# Patient Record
Sex: FEMALE | Race: WHITE | Employment: FULL TIME | URBAN - METROPOLITAN AREA
[De-identification: names, ages, dates, MRNs, and addresses within clinical notes are randomized per-mention and may not be internally consistent; named-entity substitution may affect disease eponyms.]

---

## 2020-07-08 ENCOUNTER — OFFICE VISIT (OUTPATIENT)
Dept: ORTHOPEDIC SURGERY | Age: 20
End: 2020-07-08

## 2020-07-08 VITALS
SYSTOLIC BLOOD PRESSURE: 129 MMHG | BODY MASS INDEX: 32.78 KG/M2 | OXYGEN SATURATION: 99 % | WEIGHT: 185 LBS | HEART RATE: 80 BPM | HEIGHT: 63 IN | TEMPERATURE: 98 F | DIASTOLIC BLOOD PRESSURE: 88 MMHG

## 2020-07-08 DIAGNOSIS — S83.92XA SPRAIN OF LEFT KNEE, UNSPECIFIED LIGAMENT, INITIAL ENCOUNTER: Primary | ICD-10-CM

## 2020-07-08 DIAGNOSIS — M25.562 ACUTE PAIN OF LEFT KNEE: ICD-10-CM

## 2020-07-08 NOTE — PROGRESS NOTES
Patient: Megan Alvarez                MRN: [id number]       SSN: [id number]  YOB: 2000        AGE: 23 y.o. SEX: female    PCP: No primary care provider on file. 07/08/20    Chief Complaint   Patient presents with    Knee Pain     LEFT     HISTORY:  Megan Alvarez is a 23 y.o. female who sustained a left knee injury on 6/26/20. She landed awkwardly on her left leg while jumping on a trampoline. She states she felt and heard a pop. Her knee swelled immediately after the injury. [facility]was [facility] on the same day where left knee x rays revealed no acute findings. She was provided a knee immobilizer and referred for orthopedic consultation. She has been experiencing pain and swelling since the injury. She is unable to ambulate without crutches. She has been out of work since her injury. Her leg feels loose and like its been in a freezer. Pain Assessment  7/8/2020   Location of Pain Knee   Location Modifiers Left   Severity of Pain 2   Quality of Pain Throbbing   Frequency of Pain Intermittent   Aggravating Factors Standing   Limiting Behavior Yes   Relieving Factors Ice;NSAID   Result of Injury Yes   Work-Related Injury No   Type of Injury Fall     Occupation, etc:  Ms. Shila Edge is a  for [employer] of work since her injury. She lives in [place] with her fiance and his parents. Ms. Shila Edge weighs 185 lbs and is 5'2.5\" tall. No results found for: HBA1C, HGBE8, JBQ0HAUM, RLB2DHMO, JBB3EFQF  Weight Metrics 7/8/2020   Weight 185 lb   BMI 33.3 kg/m2       There is no problem list on file for this patient.     REVIEW OF SYSTEMS:    Constitutional Symptoms: Negative   Eyes: Negative   Ears, Nose, Throat and Mouth: Negative   Cardiovascular: Negative   Respiratory: Negative   Genitourinary: Per HPI   Gastrointestinal: Per HPI   Integumentary (Skin and/or Breast): Negative   Musculoskeletal: Per HPI   Endocrine/Rheumatologic: Negative   Neurological: Per HPI   Hematology/Lymphatic: Negative    Allergic/Immunologic: Negative   Phychiatric: Negative    Social History     Socioeconomic History    Marital status: UNKNOWN     Spouse name: Not on file    Number of children: Not on file    Years of education: Not on file    Highest education level: Not on file   Occupational History    Not on file   Social Needs    Financial resource strain: Not on file    Food insecurity     Worry: Not on file     Inability: Not on file    Transportation needs     Medical: Not on file     Non-medical: Not on file   Tobacco Use    Smoking status: Current Some Day Smoker    Smokeless tobacco: Never Used   Substance and Sexual Activity    Alcohol use: Not on file    Drug use: Not on file    Sexual activity: Not on file   Lifestyle    Physical activity     Days per week: Not on file     Minutes per session: Not on file    Stress: Not on file   Relationships    Social connections     Talks on phone: Not on file     Gets together: Not on file     Attends Muslim service: Not on file     Active member of club or organization: Not on file     Attends meetings of clubs or organizations: Not on file     Relationship status: Not on file    Intimate partner violence     Fear of current or ex partner: Not on file     Emotionally abused: Not on file     Physically abused: Not on file     Forced sexual activity: Not on file   Other Topics Concern    Not on file   Social History Narrative    Not on file      No Known Allergies   No current outpatient medications on file. No current facility-administered medications for this visit.        PHYSICAL EXAMINATION:  Visit Vitals  /88   Pulse 80   Temp 98 °F (36.7 °C) (Oral)   Ht 5' 2.5\" (1.588 m)   Wt 185 lb (83.9 kg)   SpO2 99%   BMI 33.30 kg/m²      ORTHO EXAMINATION:  Examination Right knee Left knee   Skin Intact Intact   Range of motion 120-0 40-20 with pain   Effusion - -   Medial joint line tenderness + +   Lateral joint line tenderness - -   Popliteal tenderness - -   Osteophytes palpable - -   Mayis - -   Patella crepitus - -   Anterior drawer - + with guarding   Lateral laxity - -   Medial laxity - -   Varus deformity - -   Valgus deformity - -   Pretibial edema - -   Calf tenderness - -      Using crutches    RADIOGRAPHS:  XR LEFT KNEE 6/27/20 [facility]  IMPRESSION No acute fractures or subluxation of left knee. -I have independently reviewed these images during this office visit. -Dr. Aristeo Tamez:  Three views - No fractures, no effusion, no joint space narrowing, no osteophytes present. Kellgren Isreal grade 0     IMPRESSION:      ICD-10-CM ICD-9-CM    1. Sprain of left knee, unspecified ligament, initial encounter S83. 92XA 844.9 REFERRAL TO PHYSICAL THERAPY      MRI KNEE LT WO CONT   2. Acute pain of left knee M25.562 719.46 REFERRAL TO PHYSICAL THERAPY      MRI KNEE LT WO CONT     PLAN: She will start a brief course of outpatient physical therapy. Left knee MRI ordered. Work note provided. Continue using crutches. She will follow up on 7/15/20 with her MRI results.     Scribed by Olinda Nguyễn ([address]) as dictated by Anayeli Medina MD

## 2020-10-05 ENCOUNTER — HOSPITAL ENCOUNTER (OUTPATIENT)
Age: 20
Discharge: HOME | End: 2020-10-05
Attending: SPECIALIST
Payer: COMMERCIAL

## 2020-10-05 DIAGNOSIS — M25.562 ACUTE PAIN OF LEFT KNEE: ICD-10-CM

## 2020-10-05 DIAGNOSIS — S83.92XA SPRAIN OF LEFT KNEE, UNSPECIFIED LIGAMENT, INITIAL ENCOUNTER: ICD-10-CM

## 2020-10-05 PROCEDURE — 73721 MRI JNT OF LWR EXTRE W/O DYE: CPT

## 2020-10-14 ENCOUNTER — OFFICE VISIT (OUTPATIENT)
Dept: ORTHOPEDIC SURGERY | Age: 20
End: 2020-10-14
Payer: COMMERCIAL

## 2020-10-14 VITALS
BODY MASS INDEX: 35.19 KG/M2 | HEART RATE: 92 BPM | HEIGHT: 63 IN | WEIGHT: 198.6 LBS | TEMPERATURE: 97.7 F | SYSTOLIC BLOOD PRESSURE: 121 MMHG | DIASTOLIC BLOOD PRESSURE: 85 MMHG | OXYGEN SATURATION: 96 %

## 2020-10-14 DIAGNOSIS — M25.562 ACUTE PAIN OF LEFT KNEE: ICD-10-CM

## 2020-10-14 DIAGNOSIS — S83.92XD SPRAIN OF LEFT KNEE, UNSPECIFIED LIGAMENT, SUBSEQUENT ENCOUNTER: ICD-10-CM

## 2020-10-14 DIAGNOSIS — E66.01 SEVERE OBESITY (HCC): ICD-10-CM

## 2020-10-14 DIAGNOSIS — S83.242A TEAR OF MEDIAL MENISCUS OF LEFT KNEE, UNSPECIFIED TEAR TYPE, UNSPECIFIED WHETHER OLD OR CURRENT TEAR, INITIAL ENCOUNTER: Primary | ICD-10-CM

## 2020-10-14 PROCEDURE — 99213 OFFICE O/P EST LOW 20 MIN: CPT | Performed by: SPECIALIST

## 2020-10-14 NOTE — PROGRESS NOTES
Patient: Juan Luis Ward                MRN: [id number]       SSN: [id number]  YOB: 2000        AGE: 23 y.o. SEX: female    PCP: None  10/14/20    CC: LEFT KNEE DISCOMFORT    HISTORY:  Juan Luis Ward is a 23 y.o. female who is seen for left knee pain. She sustained a left knee injury on 6/26/20. She landed awkwardly on her left leg while jumping on a trampoline. She states she felt and heard a pop. Her knee swelled immediately after the injury. [facility]was [facility] at [facility] on the same day where left knee x rays revealed no acute findings. She was provided a knee immobilizer and referred for orthopedic consultation. She has been experiencing pain and swelling since the injury. She is unable to ambulate without crutches. She has been out of work since her injury. Her leg feels loose and like its been in a freezer. She still has difficulty walking and straightening her knee. She feels as if her knee will snap in full extension. She experiences giving way episodes if she is full weightbearing. Pain Assessment  10/14/2020   Location of Pain Knee   Location Modifiers Left   Severity of Pain 2   Quality of Pain Sharp   Frequency of Pain Several times daily   Frequency of Pain Comment varies   Aggravating Factors Standing;Walking   Aggravating Factors Comment any pressure on left knee   Limiting Behavior -   Relieving Factors Ice;Elevation   Result of Injury -   Work-Related Injury -   Type of Injury -     Occupation, etc:  Ms. Niki Collazo is a  for [employer] of work since her injury. She lives in [place] with her fiance and his parents. Ms. Niki Collazo weighs 185 lbs and is 5'2.5\" tall. No results found for: HBA1C, HGBE8, ZIO6HIUK, GTJ5JIHB, AHD0KJGH  Weight Metrics 10/14/2020 7/8/2020   Weight 198 lb 9.6 oz 185 lb   BMI 35.75 kg/m2 33.3 kg/m2       There is no problem list on file for this patient.     REVIEW OF SYSTEMS:    Constitutional Symptoms: Negative   Eyes: Negative   Ears, Nose, Throat and Mouth: Negative   Cardiovascular: Negative   Respiratory: Negative   Genitourinary: Per HPI   Gastrointestinal: Per HPI   Integumentary (Skin and/or Breast): Negative   Musculoskeletal: Per HPI   Endocrine/Rheumatologic: Negative   Neurological: Per HPI   Hematology/Lymphatic: Negative    Allergic/Immunologic: Negative   Phychiatric: Negative    Social History     Socioeconomic History    Marital status: UNKNOWN     Spouse name: Not on file    Number of children: Not on file    Years of education: Not on file    Highest education level: Not on file   Occupational History    Not on file   Social Needs    Financial resource strain: Not on file    Food insecurity     Worry: Not on file     Inability: Not on file    Transportation needs     Medical: Not on file     Non-medical: Not on file   Tobacco Use    Smoking status: Current Some Day Smoker    Smokeless tobacco: Never Used   Substance and Sexual Activity    Alcohol use: Not on file    Drug use: Not on file    Sexual activity: Not on file   Lifestyle    Physical activity     Days per week: Not on file     Minutes per session: Not on file    Stress: Not on file   Relationships    Social connections     Talks on phone: Not on file     Gets together: Not on file     Attends Christian service: Not on file     Active member of club or organization: Not on file     Attends meetings of clubs or organizations: Not on file     Relationship status: Not on file    Intimate partner violence     Fear of current or ex partner: Not on file     Emotionally abused: Not on file     Physically abused: Not on file     Forced sexual activity: Not on file   Other Topics Concern    Not on file   Social History Narrative    Not on file      No Known Allergies   No current outpatient medications on file. No current facility-administered medications for this visit.        PHYSICAL EXAMINATION:  Visit Vitals  /85 (BP 1 Location: Right arm, BP Patient Position: Sitting)   Pulse 92   Temp 97.7 °F (36.5 °C) (Temporal)   Ht 5' 2.5\" (1.588 m)   Wt 198 lb 9.6 oz (90.1 kg)   LMP  (LMP Unknown)   SpO2 96%   BMI 35.75 kg/m²      ORTHO EXAMINATION:  Examination Right knee Left knee   Skin Intact Intact   Range of motion 120-0 90-10    Effusion - -   Medial joint line tenderness + +   Lateral joint line tenderness - +    Popliteal tenderness - -   Osteophytes palpable - -   Mayis - -   Patella crepitus - -   Anterior drawer - + firm endpoint   Lateral laxity - -   Medial laxity - -   Varus deformity - -   Valgus deformity - -   Pretibial edema - -   Calf tenderness - -   Using crutches    MRI LEFT KNEE 10/5/20  IMPRESSION:  1. Horizontal tear of the posterior horn of the medial meniscus with associated  tiny intrameniscal ganglion cyst. Adjacent low grade vertical longitudinal tear  extending to the tibial surface. 2.  Small caliber ACL without appreciable focal defect and no significant  surrounding inflammatory change. This could reflect a normal anatomic variant or  chronic partial thickness tear. No evidence of high-grade tear. RADIOGRAPHS:  XR LEFT KNEE 6/27/20 [facility]  IMPRESSION No acute fractures or subluxation of left knee. -I have independently reviewed these images during this office visit. -Dr. Lala Freeman:  Three views - No fractures, no effusion, no joint space narrowing, no osteophytes present. Kellgren Isreal grade 0     IMPRESSION:      ICD-10-CM ICD-9-CM    1. Tear of medial meniscus of left knee, unspecified tear type, unspecified whether old or current tear, initial encounter  S83.242A 836.0 REFERRAL TO PHYSICAL THERAPY   2. Acute pain of left knee  M25.562 719.46    3. Sprain of left knee, unspecified ligament, subsequent encounter  S83. 92XD V58.89 REFERRAL TO PHYSICAL THERAPY     844.9      PLAN: Left knee MRI reviewed. She will start a brief course of outpatient physical therapy.  There is no need for surgery or injection at this time. She will follow up with Dr. Patito CHAVIS after physical therapy for a second opinion re surgical options.      Scribed by Milena Flores ([address]) as dictated by Johnny Bailey MD

## 2020-10-16 ENCOUNTER — HOSPITAL ENCOUNTER (OUTPATIENT)
Dept: PHYSICAL THERAPY | Age: 20
Discharge: HOME | End: 2020-10-16
Payer: COMMERCIAL

## 2020-10-16 PROCEDURE — 97162 PT EVAL MOD COMPLEX 30 MIN: CPT

## 2020-10-16 NOTE — PROGRESS NOTES
[facility]  [address]  [place], [address]  [phone] fax    Plan of Care/ Statement of Necessity for Physical Therapy Services    Patient name: Gideon Chow Start of Care: 10/16/2020   Referral source: Carlos Manuel Mejia MD : 2000    Medical Diagnosis: Pain in left knee [M25.562]  Payor: [place] / Plan: [id number] / Product Type: Managed Care Medicaid /  Onset Date:2020    Treatment Diagnosis: left knee pain   Prior Hospitalization: see medical history Provider#: [id number]   Medications: Verified on Patient summary List    Comorbidities: depression   Prior Level of Function: functionally I with all activities     The Plan of Care and following information is based on the information from the initial evaluation. Assessment/ key information: 22 y/o female presents with left knee pain after landing on the left leg while jumping on trampoline. She reports she did not have insurance at the time, which delayed her care. She has had a MRI and reports it showed meniscus tear. She has been ambulating with use of axillary crutches since the incident. The pt lacks 40 degrees of extension of the left knee and flexion measures 92 degrees actively. Passively her extension lacks 38 degrees to 100 degrees of flexion. Limited flexibility of the quads, hamstrings and gastroc. Limited strength is noted is the left LE as well. The pt will benefit from PT to address the aforementioned impairments. Evaluation Complexity History LOW Complexity : Zero comorbidities / personal factors that will impact the outcome / POC; Examination HIGH Complexity : 4+ Standardized tests and measures addressing body structure, function, activity limitation and / or participation in recreation  ;Presentation MEDIUM Complexity : Evolving with changing characteristics  ; Clinical Decision Making MEDIUM Complexity : FOTO score of 26-74  Overall Complexity Rating: MEDIUM  Problem List: pain affecting function, decrease ROM, decrease strength, impaired gait/ balance, decrease ADL/ functional abilitiies, decrease activity tolerance and decrease flexibility/ joint mobility   Treatment Plan may include any combination of the following: Therapeutic exercise, Therapeutic activities, Neuromuscular re-education, Physical agent/modality, Gait/balance training, Manual therapy, Aquatic therapy, Patient education and Self Care training  Patient / Family readiness to learn indicated by: asking questions and trying to perform skills  Persons(s) to be included in education: patient (P)  Barriers to Learning/Limitations: None  Patient Goal (s): To walk right again  Patient Self Reported Health Status: good  Rehabilitation Potential: good    Short Term Goals: To be accomplished in 1 weeks:   1. The pt will be I and compliant with HEP     Long Term Goals: To be accomplished in 4 weeks:   1. Improve FOTO score to 68 for improved ability for functional tasks. .   2. Improve left knee AROM extension to lacking 5 degrees or less to improve ability for gait. 3. Improve left knee AROM flexion to 120 degrees for improved ability for daily tasks   4. The pt will progress to ambulating without use of crutches in home. Frequency / Duration: Patient to be seen 3 times per week for 4 weeks. Patient/ Caregiver education and instruction: Diagnosis, prognosis, self care, activity modification and exercises   [x]  Plan of care has been reviewed with PTA Venita Kayser, PT 10/16/2020 8:19 AM    ________________________________________________________________________    I certify that the above Therapy Services are being furnished while the patient is under my care. I agree with the treatment plan and certify that this therapy is necessary.     Physician's Signature:____________Date:_________TIME:________    [employer], Date and Time must be completed for valid certification **    Please sign and return to [facility] [address] [facility] [address]  [address]  [place], [address]  [phone] fax

## 2020-10-16 NOTE — PROGRESS NOTES
PT DAILY TREATMENT NOTE 10-18    Patient Name: Lian Harmon  Date:10/16/2020  : 2000  [x]  Patient  Verified  Payor: Maddie 22 / Plan: [id number] / Product Type: Managed Care Medicaid /    In PMHW:[id number]  Out time:0820  Total Treatment Time (min): 35  Visit #: 1 of 12    Treatment Area: Pain in left knee [M25.562]    SUBJECTIVE  Pain Level (0-10 scale): 4  Any medication changes, allergies to medications, adverse drug reactions, diagnosis change, or new procedure performed?: [x] No    [] Yes (see summary sheet for update)  Subjective functional status/changes:   [] No changes reported       OBJECTIVE    Modality rationale:    Min Type Additional Details    [] Estim:  []Unatt       []IFC  []Premod                        []Other:  []w/ice   []w/heat  Position:  Location:    [] Estim: []Att    []TENS instruct  []NMES                    []Other:  []w/US   []w/ice   []w/heat  Position:  Location:    []  Traction: [] Cervical       []Lumbar                       [] Prone          []Supine                       []Intermittent   []Continuous Lbs:  [] before manual  [] after manual    []  Ultrasound: []Continuous   [] Pulsed                           []1MHz   []3MHz W/cm2:  Location:    []  Iontophoresis with dexamethasone         Location: [] Take home patch   [] In clinic    []  Ice     []  heat  []  Ice massage  []  Laser   []  Anodyne Position:  Location:    []  Laser with stim  []  Other:  Position:  Location:    []  Vasopneumatic Device Pressure:       [] lo [] med [] hi   Temperature: [] lo [] med [] hi   [] Skin assessment post-treatment:  []intact []redness- no adverse reaction    []redness  adverse reaction:     25 min [x]Eval                  []Re-Eval       10 min Therapeutic Exercise:  [] See flow sheet : HEP   Rationale: increase ROM and increase strength to improve the patients ability to perform functional tasks.                With   [] TE   [] TA   [] neuro   [] other: Patient Education: [x] Review HEP    [] Progressed/Changed HEP based on:   [] positioning   [] body mechanics   [] transfers   [] heat/ice application    [] other:      Other Objective/Functional Measures:       Pain Level (0-10 scale) post treatment: 4    ASSESSMENT/Changes in Function:      Patient will continue to benefit from skilled PT services to modify and progress therapeutic interventions, address functional mobility deficits, address ROM deficits, address strength deficits, analyze and address soft tissue restrictions, analyze and cue movement patterns and analyze and modify body mechanics/ergonomics to attain remaining goals. [x]  See Plan of Care  []  See progress note/recertification  []  See Discharge Summary         Progress towards goals / Updated goals:  Short Term Goals: To be accomplished in 1 weeks:   1. The pt will be I and compliant with HEP   IE- issued HEP  Long Term Goals: To be accomplished in 4 weeks:   1. Improve FOTO score to 68 for improved ability for functional tasks. Elana Boop IE- 52   2. Improve left knee AROM extension to lacking 5 degrees or less to improve ability for gait. IE- lacking 40 degrees   3. Improve left knee AROM flexion to 120 degrees for improved ability for daily tasks   IE: 92 degrees   4. The pt will progress to ambulating without use of crutches in home.    IE- crutches and primarily NWB left LE         PLAN  []  Upgrade activities as tolerated     []  Continue plan of care  []  Update interventions per flow sheet       []  Discharge due to:_  []  Other:_      Agnieszka Lara PT 10/16/2020  7:36 AM    Future Appointments   Date Time Provider Graciela Loyd   10/16/2020  7:45 AM Melissa Merlin, [facility]

## 2020-10-23 ENCOUNTER — HOSPITAL ENCOUNTER (OUTPATIENT)
Dept: PHYSICAL THERAPY | Age: 20
Discharge: HOME | End: 2020-10-23
Payer: COMMERCIAL

## 2020-10-23 PROCEDURE — 97110 THERAPEUTIC EXERCISES: CPT

## 2020-10-23 PROCEDURE — 97112 NEUROMUSCULAR REEDUCATION: CPT

## 2020-10-23 PROCEDURE — 97140 MANUAL THERAPY 1/> REGIONS: CPT

## 2020-10-23 NOTE — PROGRESS NOTES
PT DAILY TREATMENT NOTE 10-18    Patient Name: Gemma Thomason  Date:10/23/2020  : 2000  [x]  Patient  Verified  Payor: Maddie 22 / Plan: [address] / Product Type: Managed Care Medicaid /    In time:9:15  Out time:10:00  Total Treatment Time (min): 45  Visit #: 2 of 12    Treatment Area: Pain in left knee [M25.562]    SUBJECTIVE  Pain Level (0-10 scale): 3/10  Any medication changes, allergies to medications, adverse drug reactions, diagnosis change, or new procedure performed?: [x] No    [] Yes (see summary sheet for update)  Subjective functional status/changes:   [] No changes reported  Pt reports no new complaints of pain. OBJECTIVE    Modality rationale: decrease inflammation and decrease pain to improve the patients ability to perform ADLs with increased ease.     Min Type Additional Details    [] Estim:  []Unatt       []IFC  []Premod                        []Other:  []w/ice   []w/heat  Position:  Location:    [] Estim: []Att    []TENS instruct  []NMES                    []Other:  []w/US   []w/ice   []w/heat  Position:  Location:    []  Traction: [] Cervical       []Lumbar                       [] Prone          []Supine                       []Intermittent   []Continuous Lbs:  [] before manual  [] after manual    []  Ultrasound: []Continuous   [] Pulsed                           []1MHz   []3MHz W/cm2:  Location:    []  Iontophoresis with dexamethasone         Location: [] Take home patch   [] In clinic   10 [x]  Ice     []  heat  []  Ice massage  []  Laser   []  Anodyne Position:sitting  Location:left knee    []  Laser with stim  []  Other:  Position:  Location:    []  Vasopneumatic Device Pressure:       [] lo [] med [] hi   Temperature: [] lo [] med [] hi   [] Skin assessment post-treatment:  []intact []redness- no adverse reaction    []redness  adverse reaction:     17 min Therapeutic Exercise:  [x] See flow sheet :   Rationale: increase ROM and increase strength to improve the patients ability to perform ADLs. 10 min Neuromuscular Re-education:  [x]  See flow sheet :   Rationale: increase strength, improve coordination and increase proprioception  to improve the patients ability to perform ADLs with increased ease. 8 min Manual Therapy:  PROM into knee extension; patellar mobs inferior/superior grade 2-3. Rationale: decrease pain, increase ROM and increase tissue extensibility to improve functional mobility when ambulating. With   [] TE   [] TA   [] neuro   [] other: Patient Education: [x] Review HEP    [] Progressed/Changed HEP based on:   [] positioning   [] body mechanics   [] transfers   [] heat/ice application    [] other:      Other Objective/Functional Measures: Initiated exercises as per flow sheet. Pain Level (0-10 scale) post treatment: 4/10    ASSESSMENT/Changes in Function: Pt demonstrates improved knee extensions post manual treatment. Patient will continue to benefit from skilled PT services to modify and progress therapeutic interventions, address functional mobility deficits, address ROM deficits, address strength deficits, analyze and address soft tissue restrictions, analyze and cue movement patterns, analyze and modify body mechanics/ergonomics and assess and modify postural abnormalities to attain remaining goals. []  See Plan of Care  []  See progress note/recertification  []  See Discharge Summary         Progress towards goals / Updated goals:  Short Term Goals: To be accomplished in 1 weeks:              1. The pt will be I and compliant with HEP              IE- issued HEP  Long Term Goals: To be accomplished in 4 weeks:              1. Improve FOTO score to 68 for improved ability for functional tasks. Karen Colvin IE- 52              2. Improve left knee AROM extension to lacking 5 degrees or less to improve ability for gait.               IE- lacking 40 degrees              3. Improve left knee AROM flexion to 120 degrees for improved ability for daily tasks              IE: 92 degrees              4. The pt will progress to ambulating without use of crutches in home.               IE- crutches and primarily NWB left LE    PLAN  []  Upgrade activities as tolerated     [x]  Continue plan of care  []  Update interventions per flow sheet       []  Discharge due to:_  []  Other:_      Ernesto Talbot, PTA 10/23/2020  9:24 AM    Future Appointments   Date Time Provider Graciela Loyd   10/27/2020 12:00 PM Annia Marcum, PTA [facility]   10/28/2020 12:45 PM Annia Marcum, PTA [facility]   10/30/2020 12:45 PM Deon Spencer, PT [facility]   11/2/2020 12:00 PM Annia Marcum, PTA [facility]   11/4/2020 12:00 PM Annia Marcum, PTA [facility]   11/6/2020 11:30 AM Annia Marcum, PTA [facility]   11/9/2020 12:00 PM Annia Marcum, PTA [facility]   11/11/2020 12:00 PM Deon Spencer, PT [facility]   11/13/2020 12:00 PM Deon Spencer, PT [facility]   11/16/2020 12:00 PM Annia Marcum, PTA [facility]

## 2020-10-27 ENCOUNTER — HOSPITAL ENCOUNTER (OUTPATIENT)
Dept: PHYSICAL THERAPY | Age: 20
Discharge: HOME | End: 2020-10-27
Payer: COMMERCIAL

## 2020-10-27 PROCEDURE — 97112 NEUROMUSCULAR REEDUCATION: CPT

## 2020-10-27 PROCEDURE — 97110 THERAPEUTIC EXERCISES: CPT

## 2020-10-27 PROCEDURE — 97116 GAIT TRAINING THERAPY: CPT

## 2020-10-27 NOTE — PROGRESS NOTES
PT DAILY TREATMENT NOTE 10-18    Patient Name: Anton Mendes  Date:10/27/2020  : 2000  [x]  Patient  Verified  Payor: Maddie 22 / Plan: [id number] / Product Type: Managed Care Medicaid /    In time:12:00  Out time:12:46  Total Treatment Time (min): 55  Visit #: 3 of 12    Treatment Area: Pain in left knee [M25.562]    SUBJECTIVE  Pain Level (0-10 scale): 0/10  Any medication changes, allergies to medications, adverse drug reactions, diagnosis change, or new procedure performed?: [x] No    [] Yes (see summary sheet for update)  Subjective functional status/changes:   [x] No changes reported    OBJECTIVE    Modality rationale: decrease inflammation and decrease pain to improve the patients ability to perform ADL's. Min Type Additional Details    [] Estim:  []Unatt       []IFC  []Premod                        []Other:  []w/ice   []w/heat  Position:  Location:    [] Estim: []Att    []TENS instruct  []NMES                    []Other:  []w/US   []w/ice   []w/heat  Position:  Location:    []  Traction: [] Cervical       []Lumbar                       [] Prone          []Supine                       []Intermittent   []Continuous Lbs:  [] before manual  [] after manual    []  Ultrasound: []Continuous   [] Pulsed                           []1MHz   []3MHz W/cm2:  Location:    []  Iontophoresis with dexamethasone         Location: [] Take home patch   [] In clinic   10 [x]  Ice     []  heat  []  Ice massage  []  Laser   []  Anodyne Position: Reclined  Location: Left knee    []  Laser with stim  []  Other:  Position:  Location:    []  Vasopneumatic Device Pressure:       [] lo [] med [] hi   Temperature: [] lo [] med [] hi   [] Skin assessment post-treatment:  []intact []redness- no adverse reaction    []redness  adverse reaction:     15 min Therapeutic Exercise:  [x] See flow sheet :   Rationale: increase ROM and increase strength to improve the patients ability to perform ADL's. 8 min Neuromuscular Re-education:  [x]  See flow sheet : Weight shifts, TKE with ball on wall. Rationale: increase strength, improve coordination and increase proprioception  to improve the patients ability to ambulate without AD at home and in community. 5 min Manual Therapy:  Left patellar mobs, knee extension mobs. Knee PROM ext/flex. Rationale: decrease pain, increase ROM and increase tissue extensibility to improve heel/toe gait pattern. 8 min Gait Trainin feet with Right axillary crutch device on level surfaces with Supervised level of assist. Cueing for Left heel strike and toe off. Rationale: normalize gait so pt may ambulate without AD. With   [x] TE   [] TA   [] neuro   [] other: Patient Education: [x] Review HEP    [] Progressed/Changed HEP based on:   [] positioning   [] body mechanics   [] transfers   [] heat/ice application    [] other:      Other Objective/Functional Measures: Ambulating with Right axillary crutch and Left toe weight bearing, worked on heel strike to normalize gait pattern. Added TKE with ball against wall 10x5\". Added supine SLR flexion. Pain Level (0-10 scale) post treatment: 4/10    ASSESSMENT/Changes in Function: Pt fully participated in treatment. Able to perform SLR's, unable to maintain TKE. Demonstrated improved gait mechanics, improved Left heel strike after receiving cueing and practice. Patient will continue to benefit from skilled PT services to modify and progress therapeutic interventions, address functional mobility deficits, address ROM deficits, address strength deficits, analyze and address soft tissue restrictions and analyze and cue movement patterns to attain remaining goals. [x]  See Plan of Care  []  See progress note/recertification  []  See Discharge Summary         Progress towards goals / Updated goals:  Short Term Goals: To be accomplished in 1 weeks:              1.  The pt will be I and compliant with HEP              BF- issued HEP   Current: Met, pt reports HEP compliance. 10/27/2020  Long Term Goals: To be accomplished in 4 weeks:              1. Improve FOTO score to 68 for improved ability for functional tasks. .              IK- 52              2. Improve left knee AROM extension to lacking 5 degrees or less to improve ability for gait.             LR- lacking 40 degrees              3. Improve left knee AROM flexion to 120 degrees for improved ability for daily tasks              IE: 92 degrees              4.  The pt will progress to ambulating without use of crutches in home.              IE- crutches and primarily NWB left LE    PLAN  []  Upgrade activities as tolerated     [x]  Continue plan of care  []  Update interventions per flow sheet       []  Discharge due to:_  []  Other:_      Derrick Vazquez, PTA 10/27/2020  11:54 AM    Future Appointments   Date Time Provider Graciela Loyd   10/27/2020 12:00 PM Ani Faster, PTA [facility]   10/28/2020 12:45 PM Ani Faster, PTA [facility]   10/30/2020 12:45 PM Belcamp Magallanes, PT [facility]   11/2/2020 12:00 PM Ani Faster, PTA [facility]   11/4/2020 12:00 PM Ani Faster, PTA [facility]   11/6/2020 11:30 AM Ani Faster, PTA [facility]   11/9/2020 12:00 PM Nai Faster, PTA [facility]   11/11/2020 12:00 PM Bob Magallanes, PT [facility]   11/13/2020 12:00 PM Belcamp Magallanes, PT [facility]   11/16/2020 12:00 PM Ani Faster, PTA [facility]

## 2020-10-28 ENCOUNTER — HOSPITAL ENCOUNTER (OUTPATIENT)
Dept: PHYSICAL THERAPY | Age: 20
Discharge: HOME | End: 2020-10-28
Payer: COMMERCIAL

## 2020-10-28 PROCEDURE — 97112 NEUROMUSCULAR REEDUCATION: CPT

## 2020-10-28 PROCEDURE — 97110 THERAPEUTIC EXERCISES: CPT

## 2020-10-28 PROCEDURE — 97116 GAIT TRAINING THERAPY: CPT

## 2020-10-28 NOTE — PROGRESS NOTES
PT DAILY TREATMENT NOTE 10-18    Patient Name: Dari Patel  Date:10/28/2020  : 2000  [x]  Patient  Verified  Payor: Maddie 22 / Plan: [id number] / Product Type: Managed Care Medicaid /    In time:12:45  Out time:1:31  Total Treatment Time (min): 55  Visit #: 4 of 12    Treatment Area: Pain in left knee [M25.562]    SUBJECTIVE  Pain Level (0-10 scale): 5/10  Any medication changes, allergies to medications, adverse drug reactions, diagnosis change, or new procedure performed?: [x] No    [] Yes (see summary sheet for update)  Subjective functional status/changes:   [] No changes reported  \"It's more sore today. I went to the gym and I think I over did it. I was riding the bike and I increased the resistance level. \"    OBJECTIVE    Modality rationale: decrease inflammation and decrease pain to improve the patients ability to perform ADL's.    Min Type Additional Details    [] Estim:  []Unatt       []IFC  []Premod                        []Other:  []w/ice   []w/heat  Position:  Location:    [] Estim: []Att    []TENS instruct  []NMES                    []Other:  []w/US   []w/ice   []w/heat  Position:  Location:    []  Traction: [] Cervical       []Lumbar                       [] Prone          []Supine                       []Intermittent   []Continuous Lbs:  [] before manual  [] after manual    []  Ultrasound: []Continuous   [] Pulsed                           []1MHz   []3MHz W/cm2:  Location:    []  Iontophoresis with dexamethasone         Location: [] Take home patch   [] In clinic   10 [x]  Ice     []  heat  []  Ice massage  []  Laser   []  Anodyne Position: Reclined  Location: Left knee    []  Laser with stim  []  Other:  Position:  Location:    []  Vasopneumatic Device Pressure:       [] lo [] med [] hi   Temperature: [] lo [] med [] hi   [] Skin assessment post-treatment:  []intact []redness- no adverse reaction    []redness  adverse reaction:     15 min Therapeutic Exercise:  [x] See flow sheet :   Rationale: increase ROM and increase strength to improve the patients ability to perform ADL's.    8 min Neuromuscular Re-education:  [x]  See flow sheet : Weight shifts, TKE with ball on wall. Rationale: increase strength, improve balance and increase proprioception  to improve the patients ability to perform ADL's and normalize gait. 5 min Manual Therapy:  Left patellar mobs, knee extension mobs. Knee PROM ext/flex. Rationale: decrease pain, increase ROM and increase tissue extensibility to improve heel/toe gait pattern. 8 min Gait Trainin feet with Right axillary crutch device on level surfaces with Supervised level of assist. Cueing for Left heel strike and toe off. Rationale: normalize gait so pt may ambulate without AD. With   [x] TE   [] TA   [] neuro   [] other: Patient Education: [x] Review HEP    [] Progressed/Changed HEP based on:   [] positioning   [] body mechanics   [] transfers   [] heat/ice application    [] other:      Other Objective/Functional Measures: Added front/back weight shifts. Pain Level (0-10 scale) post treatment: 6-7/10    ASSESSMENT/Changes in Function: Demonstrates improved Left heel strike during gait. Lacking Left TKE, pain with end range extension actively and passively. Pt left in no apparent distress. Patient will continue to benefit from skilled PT services to modify and progress therapeutic interventions, address functional mobility deficits, address ROM deficits, address strength deficits, analyze and address soft tissue restrictions and analyze and cue movement patterns to attain remaining goals. [x]  See Plan of Care  []  See progress note/recertification  []  See Discharge Summary         Progress towards goals / Updated goals:  Short Term Goals: To be accomplished in 1 weeks:              1.  The pt will be I and compliant with HEP              IE- issued HEP              Current: Met, pt reports HEP compliance. 10/27/2020  Long Term Goals: To be accomplished in 4 weeks:              1. Improve FOTO score to 68 for improved ability for functional tasks. .              ZC- 52              2. Improve left knee AROM extension to lacking 5 degrees or less to improve ability for gait.             MO- lacking 40 degrees              3. Improve left knee AROM flexion to 120 degrees for improved ability for daily tasks              IE: 92 degrees              4.  The pt will progress to ambulating without use of crutches in home.              IE- crutches and primarily NWB left LE    PLAN  []  Upgrade activities as tolerated     [x]  Continue plan of care  []  Update interventions per flow sheet       []  Discharge due to:_  []  Other:_      Gabe Chaney, BRITTNEY 10/28/2020  12:44 PM    Future Appointments   Date Time Provider Graciela Loyd   10/28/2020 12:45 PM Ivory Gayle PTA [facility]   10/30/2020 12:45 PM Charl Solar, PT [facility]   11/2/2020 12:00 PM Ivory Gayle PTA [facility]   11/4/2020 12:00 PM Ivory Gayle, PTA [facility]   11/6/2020 11:30 AM Ivory Gayle PTA [facility]   11/9/2020 12:00 PM Ivory Gayle, PTA [facility]   11/11/2020 12:00 PM Charl Solar, PT [facility]   11/13/2020 12:00 PM Charl Solar, PT [facility]   11/16/2020 12:00 PM Ivory Gayle, PTA [facility]

## 2020-10-30 ENCOUNTER — HOSPITAL ENCOUNTER (OUTPATIENT)
Dept: PHYSICAL THERAPY | Age: 20
Discharge: HOME | End: 2020-10-30
Payer: COMMERCIAL

## 2020-10-30 PROCEDURE — 97112 NEUROMUSCULAR REEDUCATION: CPT

## 2020-10-30 PROCEDURE — 97110 THERAPEUTIC EXERCISES: CPT

## 2020-10-30 PROCEDURE — 97116 GAIT TRAINING THERAPY: CPT

## 2020-10-30 NOTE — PROGRESS NOTES
PT DAILY TREATMENT NOTE 10-18    Patient Name: Clearance Merdevin  Date:10/30/2020  : 2000  [x]  Patient  Verified  Payor: Maddie 22 / Plan: [id number] / Product Type: Managed Care Medicaid /    In time:1245  Out time:138  Total Treatment Time (min): 53 (43 billable)  Visit #: 5 of 12     Treatment Area: Pain in left knee [M25.562]    SUBJECTIVE  Pain Level (0-10 scale): 2  Any medication changes, allergies to medications, adverse drug reactions, diagnosis change, or new procedure performed?: [x] No    [] Yes (see summary sheet for update)  Subjective functional status/changes:   [] No changes reported  Less pain today. Knee continues to feel \"stuck\" in slight flexion. OBJECTIVE    Modality rationale: decrease edema, decrease inflammation and decrease pain to improve the patients ability to perform ADLs. Min Type Additional Details   10 [x]  Ice     []  heat  []  Ice massage  []  Laser   []  Anodyne Position:  Location:   [] Skin assessment post-treatment:  []intact []redness- no adverse reaction    []redness  adverse reaction:      24 min Therapeutic Exercise:  [x] See flow sheet :   Rationale: increase ROM and increase strength to improve the patients ability to perform ADLs. 8 min Neuromuscular Re-education:  []  See flow sheet : Weight shifts, TKE in prone. Rationale: increase strength, improve balance and increase proprioception  to improve the patients ability to perform ADLs with improved ease. 5 min Manual Therapy:  Terminal knee extension mobilizations, gentle lower leg distraction with knee extension over pressure stretching. Wound care per assessment note. Rationale: increase ROM and increase tissue extensibility to ambulate with improved ease.     8 min Gait Training:  _300 feet with Right axillary crutch device on level surfaces with Supervised level of assist. Cueing for Left heel strike and toe off and terminal knee ext stretch   Rationale: Improve ease of ambulation          With   [] TE   [] TA   [] neuro   [] other: Patient Education: [x] Review HEP    [] Progressed/Changed HEP based on:   [] positioning   [] body mechanics   [] transfers   [] heat/ice application    [] other:      Other Objective/Functional Measures: terminal knee ext in prone     Pain Level (0-10 scale) post treatment: 7-8    ASSESSMENT/Changes in Function: Pt performs exercises as directed today. Has B cuts on knees that are bleeding secondary to shaving so these are cleansed with alcohol pads and bandaged by therapist prior to gait training. Continue to progress strengthening and ROM activities as able. Patient will continue to benefit from skilled PT services to modify and progress therapeutic interventions, address functional mobility deficits, address ROM deficits, address strength deficits, analyze and address soft tissue restrictions, analyze and cue movement patterns, analyze and modify body mechanics/ergonomics, assess and modify postural abnormalities, address imbalance/dizziness and instruct in home and community integration to attain remaining goals. [x]  See Plan of Care  []  See progress note/recertification  []  See Discharge Summary         Progress towards goals / Updated goals:  Short Term Goals: To be accomplished in 1 weeks:  1. The pt will be I and compliant with HEP              IE- issued HEP              NFJKFUZ: Met, pt reports HEP compliance. 10/27/2020  Long Term Goals: To be accomplished in 4 weeks:  1. Improve FOTO score to 68 for improved ability for functional tasks. .              MW- 47  2. Improve left knee AROM extension to lacking 5 degrees or less to improve ability for gait.             AN- lacking 40 degrees   Current: progressing, lacking 15 degrees (10/30/2020)  3. Improve left knee AROM flexion to 120 degrees for improved ability for daily tasks              IE: 92 degrees  4.  The pt will progress to ambulating without use of crutches in home.              IE- crutches and primarily NWB left LE    PLAN  []  Upgrade activities as tolerated     [x]  Continue plan of care  []  Update interventions per flow sheet       []  Discharge due to:_  []  Other:_      Nancy Jenkins, PT 10/30/2020  12:55 PM    Future Appointments   Date Time Provider Graciela Loyd   11/2/2020 12:00 PM Lonnie Zheng, PTA [facility]   11/4/2020 12:00 PM Lonnie Zheng, PTA [facility]   11/6/2020 11:30 AM Lonnie Zheng, PTA [facility]   11/9/2020 12:00 PM Lonnie Zheng, PTA [facility]   11/11/2020 12:00 PM Yousuf Contreras, PT [facility]   11/13/2020 12:00 PM Yousuf Iba, PT [facility]   11/16/2020 12:00 PM Lonnieotto Zheng, PTA [facility]

## 2020-11-02 ENCOUNTER — HOSPITAL ENCOUNTER (OUTPATIENT)
Dept: PHYSICAL THERAPY | Age: 20
Discharge: HOME | End: 2020-11-02
Payer: COMMERCIAL

## 2020-11-02 PROCEDURE — 97116 GAIT TRAINING THERAPY: CPT

## 2020-11-02 PROCEDURE — 97112 NEUROMUSCULAR REEDUCATION: CPT

## 2020-11-02 PROCEDURE — 97110 THERAPEUTIC EXERCISES: CPT

## 2020-11-04 ENCOUNTER — HOSPITAL ENCOUNTER (OUTPATIENT)
Dept: PHYSICAL THERAPY | Age: 20
Discharge: HOME | End: 2020-11-04
Payer: COMMERCIAL

## 2020-11-04 PROCEDURE — 97110 THERAPEUTIC EXERCISES: CPT

## 2020-11-04 PROCEDURE — 97116 GAIT TRAINING THERAPY: CPT

## 2020-11-04 PROCEDURE — 97112 NEUROMUSCULAR REEDUCATION: CPT

## 2020-11-04 NOTE — PROGRESS NOTES
PT DAILY TREATMENT NOTE     Patient Name: Ana Paula Trujillo  Date:2020  : 2000  [x]  Patient  Verified  Payor: Maddie 22 / Plan: [id number] / Product Type: Managed Care Medicaid /    In time:12:00  Out time:12:53  Total Treatment Time (min): 53  Visit #: 7 of 12    Treatment Area: Pain in left knee [M25.562]    SUBJECTIVE  Pain Level (0-10 scale): 1/10  Any medication changes, allergies to medications, adverse drug reactions, diagnosis change, or new procedure performed?: [x] No    [] Yes (see summary sheet for update)  Subjective functional status/changes:   [] No changes reported  \"Not as sore this morning. \"    OBJECTIVE    Modality rationale: decrease inflammation and decrease pain to improve the patients ability to perform ADL's.    Min Type Additional Details    [] Estim:  []Unatt       []IFC  []Premod                        []Other:  []w/ice   []w/heat  Position:  Location:    [] Estim: []Att    []TENS instruct  []NMES                    []Other:  []w/US   []w/ice   []w/heat  Position:  Location:    []  Traction: [] Cervical       []Lumbar                       [] Prone          []Supine                       []Intermittent   []Continuous Lbs:  [] before manual  [] after manual    []  Ultrasound: []Continuous   [] Pulsed                           []1MHz   []3MHz W/cm2:  Location:    []  Iontophoresis with dexamethasone         Location: [] Take home patch   [] In clinic   10 [x]  Ice     []  heat  []  Ice massage  []  Laser   []  Anodyne Position: Reclined  Location: Left knee    []  Laser with stim  []  Other:  Position:  Location:    []  Vasopneumatic Device Pressure:       [] lo [] med [] hi   Temperature: [] lo [] med [] hi   [] Skin assessment post-treatment:  []intact []redness- no adverse reaction    []redness  adverse reaction:     22 min Therapeutic Exercise:  [x] See flow sheet :   Rationale: increase ROM and increase strength to improve the patients ability to perform ADL's.    8 min Neuromuscular Re-education:  [x]  See flow sheet : Weight shifts, TKE in prone. Rationale: improve coordination, improve balance and increase proprioception  to improve the patients ability to perform functional activities. 5 min Manual Therapy:  Left patellar mobs, knee extension mobs. Knee PROM ext/flex. The manual therapy interventions were performed at a separate and distinct time from the therapeutic activities interventions. Rationale: decrease pain, increase ROM and increase tissue extensibility to improve heel strike during gait. 8 min Gait Trainin' forward with Right axillary crutch, 120 forward, 120' retro and 120' lateral without AD on level surfaces with Supervised level of assist.    Rationale: to normalize gait so pt may ambulate without AD. With   [x] TE   [] TA   [] neuro   [] other: Patient Education: [x] Review HEP    [] Progressed/Changed HEP based on:   [] positioning   [] body mechanics   [] transfers   [] heat/ice application    [] other:      Other Objective/Functional Measures: AROM Left knee flexion 131 degrees without pain. Added side-lying hip abd 10x. Pain Level (0-10 scale) post treatment: 4/10    ASSESSMENT/Changes in Function: Able to ambulate without AD, pt hesitant secondary to feeling unstable, fair Left heel strike, poor Left toe off phase of gait. Continue PT to increase Left knee extension AROM/strength. Patient will continue to benefit from skilled PT services to modify and progress therapeutic interventions, address functional mobility deficits, address ROM deficits, address strength deficits, analyze and address soft tissue restrictions and analyze and cue movement patterns to attain remaining goals. [x]  See Plan of Care  []  See progress note/recertification  []  See Discharge Summary         Progress towards goals / Updated goals:  Short Term Goals: To be accomplished in 1 weeks:  1.  The pt will be I and compliant with HEP              IE- issued HEP              DWCUBIZ: Met, pt reports HEP compliance. 10/27/2020  Long Term Goals: To be accomplished in 4 weeks:  1. Improve FOTO score to 68 for improved ability for functional tasks. .              HB- 47  2. Improve left knee AROM extension to lacking 5 degrees or less to improve ability for gait.             TN- lacking 40 degrees              Current: progressing, lacking 15 degrees (10/30/2020)  3. Improve left knee AROM flexion to 120 degrees for improved ability for daily tasks              IE: 92 degrees   Current: Met, AROM Left knee flexion 131 degrees without pain. 11/4/2020  4.  The pt will progress to ambulating without use of crutches in home.              IE- crutches and primarily NWB left LE              Current: 1 axillary crutch in house and community, feels unstable without per pt. 11/2/2020    PLAN  []  Upgrade activities as tolerated     [x]  Continue plan of care  []  Update interventions per flow sheet       []  Discharge due to:_  []  Other:_      Mukesh Gardner PTA 11/4/2020  12:01 PM    Future Appointments   Date Time Provider Graciela Loyd   11/6/2020 11:30 AM Christian Rodney, PTA [facility]   11/9/2020 12:00 PM Christian Rodney, PTA [facility]   11/11/2020 12:00 PM Rhianna Lubin, PT [facility]   11/13/2020 12:00 PM Rhianna Lubin, PT [facility]   11/16/2020 12:00 PM Christian Rodney, PTA [facility]

## 2020-11-09 ENCOUNTER — HOSPITAL ENCOUNTER (OUTPATIENT)
Dept: PHYSICAL THERAPY | Age: 20
Discharge: HOME | End: 2020-11-09
Payer: COMMERCIAL

## 2020-11-09 PROCEDURE — 97112 NEUROMUSCULAR REEDUCATION: CPT

## 2020-11-09 PROCEDURE — 97116 GAIT TRAINING THERAPY: CPT

## 2020-11-09 PROCEDURE — 97110 THERAPEUTIC EXERCISES: CPT

## 2020-11-09 NOTE — PROGRESS NOTES
PT DAILY TREATMENT NOTE     Patient Name: Norberto Douglas  Date:2020  : 2000  [x]  Patient  Verified   Payor: Maddie 22 / Plan: [id number] / Product Type: Managed Care Medicaid /    In time:12:00  Out time:12:48  Total Treatment Time (min): 48  Visit #: 8 of 12    Treatment Area: Pain in left knee [M25.562]    SUBJECTIVE  Pain Level (0-10 scale): 2/10  Any medication changes, allergies to medications, adverse drug reactions, diagnosis change, or new procedure performed?: [x] No    [] Yes (see summary sheet for update)  Subjective functional status/changes:   [] No changes reported  \"I've been pushing it more because I need to return to work in two weeks or I lose my job. \"    OBJECTIVE    Modality rationale: decrease inflammation and decrease pain to improve the patients ability to perform ADL's.    Min Type Additional Details    [] Estim:  []Unatt       []IFC  []Premod                        []Other:  []w/ice   []w/heat  Position:  Location:    [] Estim: []Att    []TENS instruct  []NMES                    []Other:  []w/US   []w/ice   []w/heat  Position:  Location:    []  Traction: [] Cervical       []Lumbar                       [] Prone          []Supine                       []Intermittent   []Continuous Lbs:  [] before manual  [] after manual    []  Ultrasound: []Continuous   [] Pulsed                           []1MHz   []3MHz W/cm2:  Location:    []  Iontophoresis with dexamethasone         Location: [] Take home patch   [] In clinic   10 [x]  Ice     []  heat  []  Ice massage  []  Laser   []  Anodyne Position: Reclined  Location: Left knee    []  Laser with stim  []  Other:  Position:  Location:    []  Vasopneumatic Device Pressure:       [] lo [] med [] hi   Temperature: [] lo [] med [] hi   [] Skin assessment post-treatment:  []intact []redness- no adverse reaction    []redness  adverse reaction:     17 min Therapeutic Exercise:  [x] See flow sheet : Rationale: increase ROM, increase strength and increase proprioception to improve the patients ability to perform ADL's.     8 min Neuromuscular Re-education:  [x]  See flow sheet : Weight shifts, TKE with orange t-band. Rationale: increase strength, improve coordination and increase proprioception  to improve the patients ability to improve heel strike during gait. 5 min Manual Therapy:  Left patellar mobs, knee extension mobs. Knee PROM ext/flex. The manual therapy interventions were performed at a separate and distinct time from the therapeutic activities interventions. Rationale: decrease pain, increase ROM, increase tissue extensibility and decrease trigger points to improve ease of performing functional activities. 8 min Gait Trainin forward, 120' retro, marching 120'  and 120' lateral without AD on level surfaces with Supervised level of assist.    Rationale: Normalize gait pattern. With   [x] TE   [] TA   [] neuro   [] other: Patient Education: [x] Review HEP    [] Progressed/Changed HEP based on:   [] positioning   [] body mechanics   [] transfers   [] heat/ice application    [] other:      Other Objective/Functional Measures: Added marching for 120'. Focusing on increasing Left knee extension AROM/PROM to normalize gait and improve ease of performing ADL's. Pain Level (0-10 scale) post treatment: 3/10    ASSESSMENT/Changes in Function: Pt puts forth good effort in clinic. Continues to have pain with end range PROM ext. Patient will continue to benefit from skilled PT services to modify and progress therapeutic interventions, address functional mobility deficits, address ROM deficits, address strength deficits, analyze and address soft tissue restrictions, analyze and cue movement patterns and analyze and modify body mechanics/ergonomics to attain remaining goals.      [x]  See Plan of Care  []  See progress note/recertification  []  See Discharge Summary         Progress towards goals / Updated goals:  Short Term Goals: To be accomplished in 1 weeks:  1. The pt will be I and compliant with HEP              IE- issued HEP              BHEQEUZ: Met, pt reports HEP compliance. 10/27/2020  Long Term Goals: To be accomplished in 4 weeks:  1. Improve FOTO score to 68 for improved ability for functional tasks. .              EX- 47  2. Improve left knee AROM extension to lacking 5 degrees or less to improve ability for gait.             OA- lacking 40 degrees              Current: progressing, lacking 15 degrees (10/30/2020)  3. Improve left knee AROM flexion to 120 degrees for improved ability for daily tasks              IE: 92 degrees              Current: Met, AROM Left knee flexion 131 degrees without pain. 11/4/2020  4. The pt will progress to ambulating without use of crutches in home.              IE- crutches and primarily NWB left LE              Current: 1 axillary crutch in house and community, feels unstable without per pt. 11/2/2020; Ambulating without AD, lacking Left knee TKE.  11/9/2020    PLAN  []  Upgrade activities as tolerated     [x]  Continue plan of care  []  Update interventions per flow sheet       []  Discharge due to:_  []  Other:_      Jeb Wong PTA 11/9/2020  12:01 PM    Future Appointments   Date Time Provider Graciela Loyd   11/11/2020 12:00 PM [facility]   11/13/2020 12:00 PM Neisha Nunes, PT [facility]   11/16/2020 12:00 PM [facility] Sanchez [facility]

## 2020-11-11 ENCOUNTER — HOSPITAL ENCOUNTER (OUTPATIENT)
Dept: PHYSICAL THERAPY | Age: 20
Discharge: HOME | End: 2020-11-11
Payer: COMMERCIAL

## 2020-11-11 PROCEDURE — 97112 NEUROMUSCULAR REEDUCATION: CPT

## 2020-11-11 PROCEDURE — 97110 THERAPEUTIC EXERCISES: CPT

## 2020-11-11 PROCEDURE — 97116 GAIT TRAINING THERAPY: CPT

## 2020-11-11 NOTE — PROGRESS NOTES
PT DAILY TREATMENT NOTE     Patient Name: Dari Patel  Date:2020  : 2000  [x]  Patient  Verified  Payor: Maddie Sanchez / Plan: [address] / Product Type: Managed Care Medicaid /    In time:1202  Out time:1257  Total Treatment Time (min): 55  Visit #: 1 of     Treatment Area: Pain in left knee [M25.562]    SUBJECTIVE  Pain Level (0-10 scale): 2  Any medication changes, allergies to medications, adverse drug reactions, diagnosis change, or new procedure performed?: [x] No    [] Yes (see summary sheet for update)  Subjective functional status/changes:   [] No changes reported  Works as a  and not feeling confident about ability to walk fast at work due to increased pain and instability with gait. OBJECTIVE    Modality rationale: decrease edema, decrease inflammation and decrease pain to improve the patients ability to improve ease of self care. Min Type Additional Details   10 [x]  Ice     []  heat  []  Ice massage  []  Laser   []  Anodyne Position:  Location:   [] Skin assessment post-treatment:  []intact []redness- no adverse reaction    []redness  adverse reaction:     27 min Therapeutic Exercise:  [] See flow sheet :   Rationale: increase ROM and increase strength to improve the patients ability to improve ease of heel strike and terminal knee extension with ambulation. 8 min Neuromuscular Re-education:  [x]  See flow sheet : weight shifts, TKE with orange t-band   Rationale: increase strength, improve coordination and increase proprioception  to improve the patients ability to improve heel strike during gait. 2 min Manual Therapy:  Brief GR2-3 tib-fem oscillations with ER rotation, promoting screw home mechanism. Assessment of knee extension end feel. Rationale: decrease pain, increase ROM, increase tissue extensibility and decrease trigger points to improve ease of performing functional activities.     8 min Gait Training:  _120 forward, 120' retro, marching 120'  and 120' lateral without AD on level surfaces with Supervised level of assist.    Rationale: Improve ease of gait and normalize pattern          With   [] TE   [] TA   [] neuro   [] other: Patient Education: [x] Review HEP    [] Progressed/Changed HEP based on:   [] positioning   [] body mechanics   [] transfers   [] heat/ice application    [] other:      Other Objective/Functional Measures: see PN     Pain Level (0-10 scale) post treatment: 4-5    ASSESSMENT/Changes in Function: Pt has been making progress towards improved left knee stability, pain, and knee ROM since her [facility] on 10/16/2020. She has progressed her knee extension from lacking 40 degrees to now lacking 10 degrees and improved knee flexion ROM to 131 degrees without pain. Knee extension overpressure elicits pain and anxiety from pt due to \"stuck\" sensation to the knee. End feel is springy/rubbery with gentle lower leg distraction followed by pressure into extension. Pt likely has a medial meniscus tear that is inhibiting her ability to return to terminal knee extension. She may benefit from continued therapy to address strength and ROM impairments for return to work or potential surgery as decided by her referring MD.    Patient will continue to benefit from skilled PT services to modify and progress therapeutic interventions, address functional mobility deficits, address ROM deficits, address strength deficits, analyze and address soft tissue restrictions, analyze and cue movement patterns, analyze and modify body mechanics/ergonomics, assess and modify postural abnormalities, address imbalance/dizziness and instruct in home and community integration to attain remaining goals. []  See Plan of Care  [x]  See progress note/recertification  []  See Discharge Summary         Progress towards goals / Updated goals:  Short Term Goals: To be accomplished in 1 weeks:  1.  The pt will be I and compliant with HEP              NQ- issued HEP              [id number]: Met, pt reports HEP compliance. 10/27/2020  Long Term Goals: To be accomplished in 4 weeks:  1. Improve FOTO score to 68 for improved ability for functional tasks. .              EJ- 47   Current: remains/slow progress, 49% (11/11/2020)  2. Improve left knee AROM extension to lacking 5 degrees or less to improve ability for gait.             PX- lacking 40 degrees              Current: progressing, lacking 10 degrees, rubbery end feel (11/11/2020)  3. Improve left knee AROM flexion to 120 degrees for improved ability for daily tasks              IE: 92 degrees              Current: Met, AROM Left knee flexion 131 degrees without pain. 11/4/2020  4. The pt will progress to ambulating without use of crutches in home.              IE- crutches and primarily NWB left LE              Current: progressing, Ambulating without AD, lacking Left knee TKE, occasional LOB.  11/11/2020    PLAN  []  Upgrade activities as tolerated     [x]  Continue plan of care  []  Update interventions per flow sheet       []  Discharge due to:_  []  Other:_      Carlos Barone, PT 11/11/2020  12:14 PM    Future Appointments   Date Time Provider Graciela Loyd   11/13/2020 12:00 PM Russel Granados, PT [facility]   11/16/2020 12:00 PM Alla Bahena PTA [facility]   11/18/2020  9:10 AM Eleonora Gonzalez MD [facility]

## 2020-11-11 NOTE — PROGRESS NOTES
[address]  [address]  [facility], [address]  [phone] fax    Physical Therapy Progress Note  Patient name: Lucius Dixon Start of Care: 10/16/2020   Referral source: Konstantin Vickers MD : 2000   Medical/Treatment Diagnosis: Pain in left knee [M25.562]  Payor: [place] [id number] / Plan: [address] / Product Type: Managed Care Medicaid /  Onset Date:2020     Prior Hospitalization: see medical history Provider#: [id number]   Medications: Verified on Patient Summary List    Comorbidities: depression  Prior Level of Function:functionally I with all activities  Visits from Start of Care: 9    Missed Visits: 1      Established Goals:        Excellent         Good         Limited            None  [x] Increased ROM   []  [x]  []  []  [x] Increased Strength  []  [x]  []  []  [x] Increased Mobility  []  []  [x]  []   [x] Decreased Pain   []  []  [x]  []  [x] Decreased Swelling  []  [x]  []  []    Key Functional Changes: knee ROM  degrees    Updated Goals: to be achieved in 3 weeks:  1. Improve FOTO score to 68 for improved ability for functional tasks. Elana Funk PN: remains/slow progress, 49%   2. Improve left knee AROM extension to lacking 5 degrees or less to improve ability for gait.             OI: progressing, lacking 10 degrees, rubbery end feel   3. The pt will progress to ambulating without use of crutches in home.              PN: progressing, Ambulating without AD, lacking Left knee TKE, occasional LOB. ASSESSMENT/RECOMMENDATIONS:  Pt has been making progress towards improved left knee stability, pain, and knee ROM since her [facility] on 10/16/2020. She has progressed her knee extension from lacking 40 degrees to now lacking 10 degrees and improved knee flexion ROM to 131 degrees without pain. Knee extension overpressure elicits pain and anxiety from pt due to \"stuck\" sensation to the knee.  End feel is springy/rubbery with gentle lower leg distraction followed by pressure into extension. Pt likely has a medial meniscus tear that is inhibiting her ability to return to terminal knee extension. She may benefit from continued therapy to address strength, ambulation, balance, and ROM impairments for return to work or potential surgery as decided by her referring MD.    [x]Continue therapy per initial plan/protocol at a frequency of  2 x per week for 3 weeks  []Continue therapy with the following recommended changes:_____________________      _____________________________________________________________________  []Discontinue therapy progressing towards or have reached established goals  []Discontinue therapy due to lack of appreciable progress towards goals  []Discontinue therapy due to lack of attendance or compliance  []Await Physician's recommendations/decisions regarding therapy  []Other:________________________________________________________________    Thank you for this referral.    Esperanza Boyd, PT 11/11/2020 1:01 PM  NOTE TO PHYSICIAN:  PLEASE COMPLETE THE ORDERS BELOW AND   FAX TO [facility]: [phone]  If you are unable to process this request in 24 hours please contact our office: [phone] I have read the above report and request that my patient continue as recommended. ? I have read the above report and request that my patient continue therapy with the following changes/special instructions:__________________________________________________________  ? I have read the above report and request that my patient be discharged from therapy.     Physicians signature: ______________________________Date: ______Time:______

## 2020-11-13 ENCOUNTER — HOSPITAL ENCOUNTER (OUTPATIENT)
Dept: PHYSICAL THERAPY | Age: 20
Discharge: HOME | End: 2020-11-13
Payer: COMMERCIAL

## 2020-11-13 PROCEDURE — 97112 NEUROMUSCULAR REEDUCATION: CPT

## 2020-11-13 PROCEDURE — 97116 GAIT TRAINING THERAPY: CPT

## 2020-11-13 PROCEDURE — 97110 THERAPEUTIC EXERCISES: CPT

## 2020-11-13 NOTE — PROGRESS NOTES
PT DAILY TREATMENT NOTE     Patient Name: Lucius Dixon  Date:2020  : 2000  [x]  Patient  Verified  Payor: Maddie 22 / Plan: [address] / Product Type: Managed Care Medicaid /    In time:1200  Out time:1251  Total Treatment Time (min): 51  Visit #: 1 of 6    Medicare/BCBS Only   Total Timed Codes (min):  51 1:1 Treatment Time:  41       Treatment Area: Pain in left knee [M25.562]    SUBJECTIVE  Pain Level (0-10 scale): 2  Any medication changes, allergies to medications, adverse drug reactions, diagnosis change, or new procedure performed?: [x] No    [] Yes (see summary sheet for update)  Subjective functional status/changes:   [x] No changes reported      OBJECTIVE     Modality rationale: decrease inflammation and decrease pain to improve the patients ability to perform ADLs. Min Type Additional Details   10 [x]  Ice     []  heat  []  Ice massage  []  Laser   []  Anodyne Position: reclined  Location: left medial knee   [] Skin assessment post-treatment:  []intact []redness- no adverse reaction    []redness  adverse reaction:   20 min Therapeutic Exercise:  []? See flow sheet :   Rationale: increase ROM and increase strength to improve the patients ability to improve ease of heel strike and terminal knee extension with ambulation. 8 min Neuromuscular Re-education:  [x]? See flow sheet : weight shifts, TKE with orange t-band   Rationale: increase strength, improve coordination and increase proprioception  to improve the patients ability to improve heel strike during gait.     5 min Manual Therapy:  STM to vastus medialis and medial hamstring.  Superior patellar mobs GR2-3   Rationale: decrease pain, increase ROM, increase tissue extensibility and decrease trigger points to improve ease of performing functional activities.     8 min Gait Training:  _120 forward, 120' retro, marching 120'  and 120' lateral without AD on level surfaces with Supervised level of assist.    Rationale: Improve ease of gait and normalize pattern          With   [] TE   [] TA   [] neuro   [] other: Patient Education: [x] Review HEP    [] Progressed/Changed HEP based on:   [] positioning   [] body mechanics   [] transfers   [] heat/ice application    [] other:      Other Objective/Functional Measures: added mini squats and active hamstring stretch     Pain Level (0-10 scale) post treatment: 2    ASSESSMENT/Changes in Function: Pt performs exercises as directed. Continues to have tenderness with STM to the medial hamstring and vastus medialis. Patient will continue to benefit from skilled PT services to modify and progress therapeutic interventions, address functional mobility deficits, address ROM deficits, address strength deficits, analyze and address soft tissue restrictions, analyze and cue movement patterns, analyze and modify body mechanics/ergonomics, assess and modify postural abnormalities, address imbalance/dizziness and instruct in home and community integration to attain remaining goals. [x]  See Plan of Care  []  See progress note/recertification  []  See Discharge Summary         Progress towards goals / Updated goals:  1. Improve FOTO score to 68 for improved ability for functional tasks. .              RP: remains/slow progress, 49%   2. Improve left knee AROM extension to lacking 5 degrees or less to improve ability for gait.             BE: progressing, lacking 10 degrees, rubbery end feel   3. The pt will progress to ambulating without use of crutches in home.              PN: progressing, Ambulating without AD, lacking Left knee TKE, occasional LOB.      PLAN  []  Upgrade activities as tolerated     []  Continue plan of care  []  Update interventions per flow sheet       []  Discharge due to:_  []  Other:_      Mari Cannon, PT 11/13/2020  12:06 PM    Future Appointments   Date Time Provider Graciela Loyd   11/16/2020 12:00 PM Sanjay Gould, PTA [facility]   11/18/2020  9:10 AM Lex Vidales MD [facility]

## 2020-11-16 ENCOUNTER — HOSPITAL ENCOUNTER (OUTPATIENT)
Dept: PHYSICAL THERAPY | Age: 20
Discharge: HOME | End: 2020-11-16
Payer: COMMERCIAL

## 2020-11-16 PROCEDURE — 97116 GAIT TRAINING THERAPY: CPT

## 2020-11-16 PROCEDURE — 97112 NEUROMUSCULAR REEDUCATION: CPT

## 2020-11-16 PROCEDURE — 97110 THERAPEUTIC EXERCISES: CPT

## 2020-11-16 NOTE — PROGRESS NOTES
PT DAILY TREATMENT NOTE     Patient Name: Chloé David  Date:2020  : 2000  [x]  Patient  Verified  Payor: Maddie 22 / Plan: [address] / Product Type: Managed Care Medicaid /    In time:12:00  Out time:12:50  Total Treatment Time (min): 50  Visit #: 2 of 6    Treatment Area: Pain in left knee [M25.562]    SUBJECTIVE  Pain Level (0-10 scale): 1/10  Any medication changes, allergies to medications, adverse drug reactions, diagnosis change, or new procedure performed?: [x] No    [] Yes (see summary sheet for update)  Subjective functional status/changes:   [] No changes reported  \"Knee is doing pretty good but it's still not straight. Not having pain. \"    OBJECTIVE    Modality rationale: decrease pain to improve the patients ability to perform ADL's.    Min Type Additional Details    [] Estim:  []Unatt       []IFC  []Premod                        []Other:  []w/ice   []w/heat  Position:  Location:    [] Estim: []Att    []TENS instruct  []NMES                    []Other:  []w/US   []w/ice   []w/heat  Position:  Location:    []  Traction: [] Cervical       []Lumbar                       [] Prone          []Supine                       []Intermittent   []Continuous Lbs:  [] before manual  [] after manual    []  Ultrasound: []Continuous   [] Pulsed                           []1MHz   []3MHz W/cm2:  Location:    []  Iontophoresis with dexamethasone         Location: [] Take home patch   [] In clinic   10 [x]  Ice     []  heat  []  Ice massage  []  Laser   []  Anodyne Position: Reclined  Location: Left knee    []  Laser with stim  []  Other:  Position:  Location:    []  Vasopneumatic Device Pressure:       [] lo [] med [] hi   Temperature: [] lo [] med [] hi   [] Skin assessment post-treatment:  []intact []redness- no adverse reaction    []redness  adverse reaction:     19 min Therapeutic Exercise:  [x] See flow sheet :   Rationale: increase ROM and increase strength to improve the patients ability to perform ADL's.    8 min Neuromuscular Re-education:  [x]  See flow sheet : Squat mechanics, TKE, SLS. Rationale: improve coordination, improve balance and increase proprioception  to improve the patients ability to perform functional activities. 5 min Manual Therapy:  STM distal HS's and popliteal fossa. Left patellar mobs grade III. Pt supine. The manual therapy interventions were performed at a separate and distinct time from the therapeutic activities interventions. Rationale: decrease pain, increase ROM and increase tissue extensibility to improve gait pattern. 8 min Gait Trainin forward, 120' retro, marching 120', 120' lateral and tandem 120' without AD on level surfaces with Supervised level of assist.    Rationale: With   [x] TE   [] TA   [] neuro   [] other: Patient Education: [x] Review HEP    [] Progressed/Changed HEP based on:   [] positioning   [] body mechanics   [] transfers   [] heat/ice application    [] other:      Other Objective/Functional Measures: Added tandem gait. Added 1.5# to [facility] MAHENDRA ZUNIGA [facility]. Lacking end range extension. Pain Level (0-10 scale) post treatment: 2/10    ASSESSMENT/Changes in Function: Poor squat mechanics, unable to fully correct after extensive cueing and demonstration. Pt reported pain with deep squats, I instructed pt to perform mini-squats within comfortable range only however pt continued to perform deep squats. Continue PT to improve exercise/functional activities mechanics and knee AROM. Patient will continue to benefit from skilled PT services to modify and progress therapeutic interventions, address functional mobility deficits, address ROM deficits, address strength deficits, analyze and address soft tissue restrictions and analyze and modify body mechanics/ergonomics to attain remaining goals.      [x]  See Plan of Care  []  See progress note/recertification  []  See Discharge Summary         Progress towards goals / Updated goals:  1. Improve FOTO score to 68 for improved ability for functional tasks. .              PN: remains/slow progress, 49%   2. Improve left knee AROM extension to lacking 5 degrees or less to improve ability for gait.             NG: progressing, lacking 10 degrees, rubbery end feel   3.  The pt will progress to ambulating without use of crutches in home.              PN: progressing, Ambulating without AD, lacking Left knee TKE, occasional LOB.     PLAN  []  Upgrade activities as tolerated     [x]  Continue plan of care  []  Update interventions per flow sheet       []  Discharge due to:_  []  Other:_      [facility], PTA 11/16/2020  11:56 AM    Future Appointments   Date Time Provider Graicela Loyd   11/16/2020 12:00 PM Lara Krishnamurthy PTA [facility]   11/18/2020  9:10 AM [facility]   11/20/2020  7:00 AM [facility]   11/24/2020  4:00 PM [facility]   11/25/2020 12:00 PM [facility]   11/30/2020 12:00 PM [facility]

## 2020-11-18 ENCOUNTER — OFFICE VISIT (OUTPATIENT)
Dept: ORTHOPEDIC SURGERY | Age: 20
End: 2020-11-18
Payer: COMMERCIAL

## 2020-11-18 VITALS
HEART RATE: 82 BPM | HEIGHT: 63 IN | SYSTOLIC BLOOD PRESSURE: 125 MMHG | RESPIRATION RATE: 16 BRPM | BODY MASS INDEX: 35.86 KG/M2 | OXYGEN SATURATION: 98 % | WEIGHT: 202.4 LBS | DIASTOLIC BLOOD PRESSURE: 81 MMHG | TEMPERATURE: 95.3 F

## 2020-11-18 DIAGNOSIS — S83.242D TEAR OF MEDIAL MENISCUS OF LEFT KNEE, UNSPECIFIED TEAR TYPE, UNSPECIFIED WHETHER OLD OR CURRENT TEAR, SUBSEQUENT ENCOUNTER: Primary | ICD-10-CM

## 2020-11-18 DIAGNOSIS — S83.92XD SPRAIN OF LEFT KNEE, UNSPECIFIED LIGAMENT, SUBSEQUENT ENCOUNTER: ICD-10-CM

## 2020-11-18 DIAGNOSIS — M25.562 ACUTE PAIN OF LEFT KNEE: ICD-10-CM

## 2020-11-18 PROCEDURE — 99213 OFFICE O/P EST LOW 20 MIN: CPT | Performed by: SPECIALIST

## 2020-11-18 NOTE — PROGRESS NOTES
Patient: Livier Adler                MRN: [id number]       SSN: [id number]  YOB: 2000        AGE: 23 y.o. SEX: female    PCP: None  11/18/20    Chief Complaint   Patient presents with    Knee Pain     left knee pain      HISTORY:  Livier Adler is a 23 y.o. female who is seen for left knee pain and stiffness. Her pain has decreased and she is able to walk better. She is still unable to fully straighten her knee. She sustained a left knee injury on 6/26/20. She landed awkwardly on her left leg while jumping on a trampoline. She states she felt and heard a pop. Her knee swelled immediately after the injury. She was seen at [facility] ED on the same day where left knee x rays revealed no acute findings. She was provided a knee immobilizer and referred for orthopedic consultation. She has been experiencing pain and swelling since the injury. She is unable to ambulate without crutches. She has been out of work since her injury. Her leg feels loose and like its been in a freezer. She still has difficulty walking and straightening her knee. She feels as if her knee will snap in full extension. She experiences giving way episodes if she is full weightbearing. Pain Assessment  11/18/2020   Location of Pain Knee   Location Modifiers Left   Severity of Pain 2   Quality of Pain Sharp   Duration of Pain A few hours   Frequency of Pain Intermittent   Frequency of Pain Comment -   Aggravating Factors Standing;Walking   Aggravating Factors Comment -   Limiting Behavior -   Relieving Factors Ice   Result of Injury No   Work-Related Injury -   Type of Injury -     Occupation, etc:  Ms. Neo Marquez is a  for "[employer] of work since her injury. She will return to work and do more  work rather than driving. She lives in [place] with her fiance and his parents. Ms. Neo Marquez weighs 202 lbs and is 5'2.5\" tall.        No results found for: HBA1C, HGBE8, SOH7YQBI, ILT0NUDK, XXC0YIAO  Weight Metrics 11/18/2020 10/14/2020 7/8/2020   Weight 202 lb 6.4 oz 198 lb 9.6 oz 185 lb   BMI 36.43 kg/m2 35.75 kg/m2 33.3 kg/m2       Patient Active Problem List   Diagnosis Code    [facility] E66.01     REVIEW OF SYSTEMS:    Constitutional Symptoms: Negative   Eyes: Negative   Ears, Nose, Throat and Mouth: Negative   Cardiovascular: Negative   Respiratory: Negative   Genitourinary: Per HPI   Gastrointestinal: Per HPI   Integumentary (Skin and/or Breast): Negative   Musculoskeletal: Per HPI   Endocrine/Rheumatologic: Negative   Neurological: Per HPI   Hematology/Lymphatic: Negative    Allergic/Immunologic: Negative   Phychiatric: Negative    Social History     Socioeconomic History    Marital status: UNKNOWN     Spouse name: Not on file    Number of children: Not on file    Years of education: Not on file    Highest education level: Not on file   Occupational History    Not on file   Social Needs    Financial resource strain: Not on file    Food insecurity     Worry: Not on file     Inability: Not on file   [employer] needs     Medical: Not on file     Non-medical: Not on file   Tobacco Use    Smoking status: Former Smoker    Smokeless tobacco: Never Used   Substance and Sexual Activity    Alcohol use: Not on file    Drug use: Not on file    Sexual activity: Not on file   Lifestyle    Physical activity     Days per week: Not on file     Minutes per session: Not on file    Stress: Not on file   Relationships    Social connections     Talks on phone: Not on file     Gets together: Not on file     Attends Taoism service: Not on file     Active member of club or organization: Not on file     Attends meetings of clubs or organizations: Not on file     Relationship status: Not on file    Intimate partner violence     Fear of current or ex partner: Not on file     Emotionally abused: Not on file     Physically abused: Not on file     Forced sexual activity: Not on file Other Topics Concern    Not on file   Social History Narrative    Not on file      No Known Allergies   No current outpatient medications on file. No current facility-administered medications for this visit. PHYSICAL EXAMINATION:  Visit Vitals  /81 (BP 1 Location: Left arm, BP Patient Position: Sitting)   Pulse 82   Temp (!) 95.3 °F (35.2 °C) (Temporal)   Resp 16   Ht 5' 2.5\" (1.588 m)   Wt 202 lb 6.4 oz (91.8 kg)   SpO2 98%   BMI 36.43 kg/m²      ORTHO EXAMINATION:  Examination Right knee Left knee   Skin Intact Intact   Range of motion 120-0 115-10    Effusion - +   Medial joint line tenderness + +   Lateral joint line tenderness - +    Popliteal tenderness - -   Osteophytes palpable - -   Mayis - -   Patella crepitus - -   Anterior drawer - + firm endpoint   Lateral laxity - -   Medial laxity - -   Varus deformity - -   Valgus deformity - -   Pretibial edema - -   Calf tenderness - -   Pain with full extension L knee  MRI LEFT KNEE 10/5/20  IMPRESSION:  1. Horizontal tear of the posterior horn of the medial meniscus with associated  tiny intrameniscal ganglion cyst. Adjacent low grade vertical longitudinal tear  extending to the tibial surface. 2.  Small caliber ACL without appreciable focal defect and no significant  surrounding inflammatory change. This could reflect a normal anatomic variant or  chronic partial thickness tear. No evidence of high-grade tear. RADIOGRAPHS:  XR LEFT KNEE 6/27/20 [facility]  IMPRESSION No acute fractures or subluxation of left knee. -I have independently reviewed these images during this office visit. -Dr. Tova Alvarez:  Three views - No fractures, no effusion, no joint space narrowing, no osteophytes present. Kellgren Isreal grade 0     IMPRESSION:      ICD-10-CM ICD-9-CM    1.  Tear of medial meniscus of left knee, unspecified tear type, unspecified whether old or current tear, subsequent encounter  S83.242D V58.89      836.0    2. Acute pain of left knee  M25.562 719.46    3. Sprain of left knee, unspecified ligament, subsequent encounter  S83. 92XD V58.89      844.9      PLAN:  There is no need for surgery or injection at this time. She will follow up with Dr. Author Sherman CHAVIS after physical therapy for a second opinion re surgical options.      Scribed by Joyce Martinez ([address] [place] [address] [address]) as dictated by Renard Gary MD

## 2020-11-20 ENCOUNTER — HOSPITAL ENCOUNTER (OUTPATIENT)
Dept: PHYSICAL THERAPY | Age: 20
Discharge: HOME | End: 2020-11-20
Payer: COMMERCIAL

## 2020-11-20 PROCEDURE — 97112 NEUROMUSCULAR REEDUCATION: CPT

## 2020-11-20 PROCEDURE — 97110 THERAPEUTIC EXERCISES: CPT

## 2020-11-20 PROCEDURE — 97116 GAIT TRAINING THERAPY: CPT

## 2020-11-20 NOTE — PROGRESS NOTES
PT DAILY TREATMENT NOTE     Patient Name: Juan Luis Credit  Date:2020  : 2000  [x]  Patient  Verified  Payor: Maddie 22 / Plan: [id number] / Product Type: Managed Care Medicaid /    In time:9:16  Out time:9:49  Total Treatment Time (min): 33  Visit #: 3 of 6    Treatment Area: Pain in left knee [M25.562]    SUBJECTIVE  Pain Level (0-10 scale): 1/10  Any medication changes, allergies to medications, adverse drug reactions, diagnosis change, or new procedure performed?: [x] No    [] Yes (see summary sheet for update)  Subjective functional status/changes:   [] No changes reported  \"It's just a little soreness. I start back to work tonight. \"    OBJECTIVE    17 min Therapeutic Exercise:  [x] See flow sheet :   Rationale: increase ROM and increase strength to improve the patients ability to perform ADL's.    8 min Neuromuscular Re-education:  [x]  See flow sheet : SLS, squat mechanics   Rationale: increase strength, improve balance and increase proprioception  to improve the patients ability to perform functional activities. 8 min Gait Trainin forward, 120' retro, marching 120', 120' lateral and tandem 120' without AD on level surfaces with Supervised level of assist.    Rationale: To normalize gait, continues to have limited Left toe off phase of gait. With   [x] TE   [] TA   [] neuro   [] other: Patient Education: [x] Review HEP    [] Progressed/Changed HEP based on:   [] positioning   [] body mechanics   [] transfers   [] heat/ice application    [] other:      Other Objective/Functional Measures: AROM Left knee extension lacking 6 degrees. PD modalities post-treatment. Pain Level (0-10 scale) post treatment: 1/10    ASSESSMENT/Changes in Function: Improved AROM extension. Recommend continue PT to increase Left knee ROM and Left LE strength to improve ease of performing daily tasks.  Waiting insurance authorization to continue PT, pt currently has 3 visits scheduled. Patient will continue to benefit from skilled PT services to modify and progress therapeutic interventions, address functional mobility deficits, address ROM deficits, address strength deficits, analyze and address soft tissue restrictions, analyze and cue movement patterns and analyze and modify body mechanics/ergonomics to attain remaining goals. [x]  See Plan of Care  []  See progress note/recertification  []  See Discharge Summary         Progress towards goals / Updated goals:  1. Improve FOTO score to 68 for improved ability for functional tasks. .              PN: remains/slow progress, 49%   2. Improve left knee AROM extension to lacking 5 degrees or less to improve ability for gait.             NV: progressing, lacking 10 degrees, rubbery end feel   Current: Lacking 6 degrees ext. 11/20/2020  3.  The pt will progress to ambulating without use of crutches in home.              PN: progressing, Ambulating without AD, lacking Left knee TKE, occasional LOB.     PLAN  []  Upgrade activities as tolerated     [x]  Continue plan of care  []  Update interventions per flow sheet       []  Discharge due to:_  []  Other:_      Idalia Li PTA 11/20/2020  9:06 AM    Future Appointments   Date Time Provider Graciela Loyd   11/20/2020  9:15 AM Mahad Tovar PTA [facility]   11/24/2020  9:00 AM Karie Diane MD [facility]   11/24/2020  4:00 PM Sera Hendricks PTA [facility]   11/25/2020 12:00 PM Ressie Fee, PT [facility]   11/30/2020 12:00 PM Mahad Tovar PTA [facility]

## 2020-11-24 ENCOUNTER — OFFICE VISIT (OUTPATIENT)
Dept: ORTHOPEDIC SURGERY | Age: 20
End: 2020-11-24
Payer: COMMERCIAL

## 2020-11-24 ENCOUNTER — APPOINTMENT (OUTPATIENT)
Dept: PHYSICAL THERAPY | Age: 20
End: 2020-11-24
Payer: COMMERCIAL

## 2020-11-24 VITALS
OXYGEN SATURATION: 97 % | DIASTOLIC BLOOD PRESSURE: 68 MMHG | BODY MASS INDEX: 35.61 KG/M2 | RESPIRATION RATE: 16 BRPM | WEIGHT: 201 LBS | SYSTOLIC BLOOD PRESSURE: 134 MMHG | HEART RATE: 93 BPM | TEMPERATURE: 96.4 F | HEIGHT: 63 IN

## 2020-11-24 DIAGNOSIS — M24.662 ARTHROFIBROSIS OF KNEE JOINT, LEFT: ICD-10-CM

## 2020-11-24 DIAGNOSIS — S83.242A TEAR OF MEDIAL MENISCUS OF LEFT KNEE, CURRENT, UNSPECIFIED TEAR TYPE, INITIAL ENCOUNTER: Primary | ICD-10-CM

## 2020-11-24 PROCEDURE — 99214 OFFICE O/P EST MOD 30 MIN: CPT | Performed by: ORTHOPAEDIC SURGERY

## 2020-11-24 NOTE — PROGRESS NOTES
Astrid Rey  2000   Chief Complaint   Patient presents with    Knee Pain     left knee pain         HISTORY OF PRESENT ILLNESS  Astrid Rey is a 23 y.o. female who presents today for evaluation of left knee pain. She rates her pain 2/10 today. Pain has been present since July. She was on a trampoline when she injured her knee. Pt referred by Dr. Johnny Lobo. Has tried PT with some improvement. She states she still cannot straighten her leg completely. Pt notes limping. Pt was out of work for 3 months, just returned last week. Patient describes the pain as aching and dull that is Intermittent in nature. Symptoms are worse with standing and walking, Activity and is better with  Rest. Associated symptoms include nothing. Since problem started, it: is unchanged. Pain does not wake patient up at night. Has taken no meds for the problem. Has tried following treatments: Injections:NO; Brace:NO; Therapy:YES; Cane/Crutch:NO       No Known Allergies     History reviewed. No pertinent past medical history.    Social History     Socioeconomic History    Marital status: SINGLE     Spouse name: Not on file    Number of children: Not on file    Years of education: Not on file    Highest education level: Not on file   Occupational History    Not on file   Social Needs    Financial resource strain: Not on file    Food insecurity     Worry: Not on file     Inability: Not on file    Transportation needs     Medical: Not on file     Non-medical: Not on file   Tobacco Use    Smoking status: Former Smoker    Smokeless tobacco: Never Used   Substance and Sexual Activity    Alcohol use: Not on file    Drug use: Not on file    Sexual activity: Not on file   Lifestyle    Physical activity     Days per week: Not on file     Minutes per session: Not on file    Stress: Not on file   Relationships    Social connections     Talks on phone: Not on file     Gets together: Not on file     Attends Hindu service: Not on file     Active member of club or organization: Not on file     Attends meetings of clubs or organizations: Not on file     Relationship status: Not on file    Intimate partner violence     Fear of current or ex partner: Not on file     Emotionally abused: Not on file     Physically abused: Not on file     Forced sexual activity: Not on file   Other Topics Concern    Not on file   Social History Narrative    Not on file      History reviewed. No pertinent surgical history. History reviewed. No pertinent family history. No current outpatient medications on file. No current facility-administered medications for this visit. REVIEW OF SYSTEM   Patient denies: Weight loss, Fever/Chills, HA, Visual changes, Fatigue, Chest pain, SOB, Abdominal pain, N/V/D/C, Blood in stool or urine, Edema. Pertinent positive as above in HPI. All others were negative    PHYSICAL EXAM:   Visit Vitals  /68   Pulse 93   Temp (!) 96.4 °F (35.8 °C) (Temporal)   Resp 16   Ht 5' 2.5\" (1.588 m)   Wt 201 lb (91.2 kg)   SpO2 97%   BMI 36.18 kg/m²     The patient is a well-developed, well-nourished female   in no acute distress. The patient is alert and oriented times three. The patient is alert and oriented times three. Mood and affect are normal.  LYMPHATIC: lymph nodes are not enlarged and are within normal limits  SKIN: normal in color and non tender to palpation. There are no bruises or abrasions noted. NEUROLOGICAL: Motor sensory exam is within normal limits. Reflexes are equal bilaterally.  There is normal sensation to pinprick and light touch  MUSCULOSKELETAL:  Examination Left knee   Skin Intact   Range of motion    Effusion +   Medial joint line tenderness +   Lateral joint line tenderness -   Tenderness Pes Bursa -   Tenderness insertion MCL -   Tenderness insertion LCL -   Mayis -   Patella crepitus +   Patella grind +   Lachman -   Pivot shift -   Anterior drawer -   Posterior drawer -   Varus stress -   Valgus stress -   Neurovascular Intact   Calf Swelling and Tenderness to Palpation -   Jayme's Test -   Hamstring Cord Tightness -         IMAGING: MRI of left knee dated 10/05/2020 was reviewed and read by Dr. Torey Jasmine:   IMPRESSION:  1. Horizontal tear of the posterior horn of the medial meniscus with associated tiny intrameniscal ganglion cyst. Adjacent low grade vertical longitudinal tear extending to the tibial surface. 2.  Small caliber ACL without appreciable focal defect and no significant surrounding inflammatory change. This could reflect a normal anatomic variant or chronic partial thickness tear. No evidence of high-grade tear. XR of left knee from [facility] dated 6/27/2020 was reviewed and read by Dr. Torey Jasmine: No acute fractures or subluxation of left knee. IMPRESSION:      ICD-10-CM ICD-9-CM    1. Tear of medial meniscus of left knee, current, unspecified tear type, initial encounter  S83.242A 836.0    2. Arthrofibrosis of knee joint, left  M24.662 718.56         PLAN:  1. I discussed the risks and benefits and potential adverse outcomes of both operative vs non operative treatment of left knee medial meniscus tear and arthrofibrosis with the patient and patient wishes to proceed with arthroscopic left partial medial meniscectomy, manipulation under anesthesia, and arthroscopic lysis of adhesions. Risks of operative intervention include but not limited to bleeding, infection, deep vein thrombosis, pulmonary embolism, death, limb length discrepancy, reflexive sympathetic dystrophy, fat embolism syndrome,damage to blood vessels and nerves, malunion, non-union, delayed union, failure of hardware, post traumatic arthritis, stroke, heart attack, and death. Patient understands that infection may arise and may require numerous surgeries.  The patient was counseled at length about the risks of shakila Covid-19 during their perioperative period and any recovery window from their procedure. The patient was made aware that shakila Covid-19  may worsen their prognosis for recovering from their procedure and lend to a higher morbidity and/or mortality risk. All material risks, benefits, and reasonable alternatives including postponing the procedure were discussed. The patient does  wish to proceed with the procedure at this time. History and physical exam to be preformed at a later date. Risk factors include: BMI>35   2. No ultrasound exam indicated today  3. No cortisone injection indicated today   4. No Physical/Occupational Therapy indicated today  5. No diagnostic test indicated today:   6. No durable medical equipment indicated today  7. No referral indicated today   8. No medications indicated today:   9. No Narcotic indicated today       RTC H&P      Scribed by Yue Jordan) as dictated by Ana Laura Caldwell MD    I, Dr. Ana Laura Caldwell, confirm that all documentation is accurate.     Ana Laura Caldwell M.D.   [facility]

## 2020-11-25 ENCOUNTER — APPOINTMENT (OUTPATIENT)
Dept: PHYSICAL THERAPY | Age: 20
End: 2020-11-25
Payer: COMMERCIAL

## 2020-11-30 ENCOUNTER — APPOINTMENT (OUTPATIENT)
Dept: PHYSICAL THERAPY | Age: 20
End: 2020-11-30
Payer: COMMERCIAL

## 2021-01-05 NOTE — PROGRESS NOTES
[facility]  [address]  [place], [address]  [phone] fax    Physical Therapy Discharge Summary  Patient name: Yinka East Start of Care: 10/16/2020   Referral source: Bhupinder Plascencia MD : 2000   Medical/Treatment Diagnosis: Pain in left knee [M25.562]  Payor: [place] [id number] / Plan: [address] / Product Type: Managed Care Medicaid /  Onset Date:2020     Prior Hospitalization: see medical history Provider#: [id number]   Medications: Verified on Patient Summary List    Comorbidities: depression  Prior Level of Function:functionally I with all activities  Visits from Start of Care: 12    Missed Visits: 1  Reporting Period : 2020 to 2020    Summary of Care:  1. Improve FOTO score to 68 for improved ability for functional tasks. .              PN: remains/slow progress, 49%    D/C: not met, d/c due to lack of insurance authorization. 2. Improve left knee AROM extension to lacking 5 degrees or less to improve ability for gait.             GH: progressing, lacking 10 degrees, rubbery end feel              Current: Lacking 6 degrees ext. 2020  3. The pt will progress to ambulating without use of crutches in home.              PN: progressing, Ambulating without AD, lacking Left knee TKE, occasional LOB.    D/C: not met, d/c due to lack of insurance authorization. ASSESSMENT/RECOMMENDATIONS:  Pt has demonstrated fair gains in ROM and strength since initiating PT 10/16/2020, but continued springy end feels present with PROM with pt anxiety. Pt was not authorized for further PT visits at the time of 2020 and has not returned to PT due to impending surgery. [x]Discontinue therapy: [x] lack of insurance authorization and impending surgery    Dunnigan Ronal, PT 2021 2:42 PM    NOTE TO PHYSICIAN:  Please complete the following and fax to:    [facility] at [facility] at [phone]  . Retain this original for your records. If you are unable to process this request in   24 hours, please contact our office.      [] I have read the above report and request that my patient continue therapy with the following changes/special instructions:  [] I have read the above report and request that my patient be discharged from therapy    Physician's Signature:____________Date:_________TIME:________    ** Signature, Date and Time must be completed for valid certification **

## 2021-10-21 ENCOUNTER — HOSPITAL ENCOUNTER (EMERGENCY)
Age: 21
Discharge: HOME | End: 2021-10-21
Attending: EMERGENCY MEDICINE
Payer: COMMERCIAL

## 2021-10-21 VITALS
HEIGHT: 63 IN | RESPIRATION RATE: 16 BRPM | SYSTOLIC BLOOD PRESSURE: 131 MMHG | WEIGHT: 135 LBS | DIASTOLIC BLOOD PRESSURE: 70 MMHG | HEART RATE: 99 BPM | BODY MASS INDEX: 23.92 KG/M2 | TEMPERATURE: 98.8 F | OXYGEN SATURATION: 99 %

## 2021-10-21 DIAGNOSIS — Z32.02 NEGATIVE PREGNANCY TEST: ICD-10-CM

## 2021-10-21 DIAGNOSIS — Z20.822 SUSPECTED COVID-19 VIRUS INFECTION: Primary | ICD-10-CM

## 2021-10-21 LAB
APPEARANCE UR: CLEAR
BILIRUB UR QL: NEGATIVE
COLOR UR: YELLOW
GLUCOSE UR STRIP.AUTO-MCNC: NEGATIVE MG/DL
HCG UR QL: NEGATIVE
HGB UR QL STRIP: NEGATIVE
KETONES UR QL STRIP.AUTO: NEGATIVE MG/DL
LEUKOCYTE ESTERASE UR QL STRIP.AUTO: NEGATIVE
NITRITE UR QL STRIP.AUTO: NEGATIVE
PH UR STRIP: 7 [PH] (ref 5–8)
PROT UR STRIP-MCNC: NEGATIVE MG/DL
SARS-COV-2, COV2: NORMAL
SP GR UR REFRACTOMETRY: 1.01 (ref 1–1.03)
UROBILINOGEN UR QL STRIP.AUTO: 0.2 EU/DL (ref 0.2–1)

## 2021-10-21 PROCEDURE — U0003 INFECTIOUS AGENT DETECTION BY NUCLEIC ACID (DNA OR RNA); SEVERE ACUTE RESPIRATORY SYNDROME CORONAVIRUS 2 (SARS-COV-2) (CORONAVIRUS DISEASE [COVID-19]), AMPLIFIED PROBE TECHNIQUE, MAKING USE OF HIGH THROUGHPUT TECHNOLOGIES AS DESCRIBED BY CMS-2020-01-R: HCPCS

## 2021-10-21 PROCEDURE — 81025 URINE PREGNANCY TEST: CPT

## 2021-10-21 PROCEDURE — 93005 ELECTROCARDIOGRAM TRACING: CPT

## 2021-10-21 PROCEDURE — 81003 URINALYSIS AUTO W/O SCOPE: CPT

## 2021-10-21 PROCEDURE — 74011250637 HC RX REV CODE- 250/637: Performed by: PHYSICIAN ASSISTANT

## 2021-10-21 PROCEDURE — 99284 EMERGENCY DEPT VISIT MOD MDM: CPT

## 2021-10-21 RX ORDER — ACETAMINOPHEN 325 MG/1
650 TABLET ORAL
Qty: 20 TABLET | Refills: 0 | Status: SHIPPED | OUTPATIENT
Start: 2021-10-21

## 2021-10-21 RX ORDER — IBUPROFEN 600 MG/1
600 TABLET ORAL
Qty: 20 TABLET | Refills: 0 | Status: SHIPPED | OUTPATIENT
Start: 2021-10-21 | End: 2021-12-21

## 2021-10-21 RX ORDER — ONDANSETRON 4 MG/1
4 TABLET, ORALLY DISINTEGRATING ORAL
Qty: 20 TABLET | Refills: 0 | Status: SHIPPED | OUTPATIENT
Start: 2021-10-21

## 2021-10-21 RX ORDER — ONDANSETRON 4 MG/1
4 TABLET, ORALLY DISINTEGRATING ORAL
Status: COMPLETED | OUTPATIENT
Start: 2021-10-21 | End: 2021-10-21

## 2021-10-21 RX ADMIN — ONDANSETRON 4 MG: 4 TABLET, ORALLY DISINTEGRATING ORAL at 16:40

## 2021-10-21 NOTE — ED PROVIDER NOTES
EMERGENCY DEPARTMENT HISTORY AND PHYSICAL EXAM    Date: 10/21/2021  Patient Name: Melchor Akhtar    History of Presenting Illness     Chief Complaint   Patient presents with    Fatigue    Dizziness    Anorexia    Headache         History Provided By: Patient     Chief Complaint: fatigue, lightheaded, decreased appetite, nausea and headache   Duration: 2 days   Timing:  Gradual   Location: diffuse body   Quality: aching   Severity: moderate   Modifying Factors: none    Associated Symptoms: none       Additional History (Context): Melchor Akhtar is a 19 y.o. female with a history of marijuana abuse who presents today for history as listed above. Patient reports has not been able to go to work for the past 2 days. Denies known sick contacts or recent travel. Is not vaccinated for Covid. Reports there is a small chance of pregnancy. Has never been pregnant before. Reports some mild nausea but denies vomiting, diarrhea, chest pain, shortness of breath or abdominal pain. Patient reports that a few time she has gone to stand up she was a little lightheaded however denies any syncopal episodes. PCP: None        Past History     Past Medical History:  History reviewed. No pertinent past medical history. Past Surgical History:  History reviewed. No pertinent surgical history. Family History:  History reviewed. No pertinent family history. Social History:  Social History     Tobacco Use    Smoking status: Former Smoker    Smokeless tobacco: Never Used   Substance Use Topics    Alcohol use: Yes     Comment: occ    Drug use: Yes     Types: Marijuana       Allergies:  No Known Allergies      Review of Systems   Review of Systems   Constitutional: Positive for appetite change and fatigue. Negative for chills and fever. HENT: Negative for congestion, rhinorrhea and sore throat. Respiratory: Negative for cough and shortness of breath. Cardiovascular: Negative for chest pain. Gastrointestinal: Positive for nausea. Negative for abdominal pain, blood in stool, constipation, diarrhea and vomiting. Genitourinary: Negative for dysuria, frequency and hematuria. Musculoskeletal: Negative for back pain and myalgias. Skin: Negative for rash and wound. Neurological: Positive for light-headedness and headaches. Negative for dizziness. All other systems reviewed and are negative. All Other Systems Negative  Physical Exam     Vitals:    10/21/21 1627   BP: 131/70   Pulse: 99   Resp: 16   Temp: 98.8 °F (37.1 °C)   SpO2: 99%   Weight: 61.2 kg (135 lb)   Height: 5' 3\" (1.6 m)     Physical Exam  Vitals and nursing note reviewed. Constitutional:       General: She is not in acute distress. Appearance: She is well-developed. She is not diaphoretic. HENT:      Head: Normocephalic and atraumatic. Eyes:      Conjunctiva/sclera: Conjunctivae normal.   Cardiovascular:      Rate and Rhythm: Normal rate and regular rhythm. Heart sounds: Normal heart sounds. Pulmonary:      Effort: Pulmonary effort is normal. No respiratory distress. Breath sounds: Normal breath sounds. Chest:      Chest wall: No tenderness. Abdominal:      General: Bowel sounds are normal. There is no distension. Palpations: Abdomen is soft. Tenderness: There is no abdominal tenderness. There is no guarding or rebound. Comments: Soft and nontender, no peritoneal signs   Musculoskeletal:         General: No deformity. Cervical back: Normal range of motion and neck supple. Skin:     General: Skin is warm and dry. Neurological:      Mental Status: She is alert and oriented to person, place, and time. GCS: GCS eye subscore is 4. GCS verbal subscore is 5. GCS motor subscore is 6. Motor: Motor function is intact. Gait: Gait normal.      Deep Tendon Reflexes: Reflexes are normal and symmetric.            Diagnostic Study Results     Labs -     Recent Results (from the past 12 hour(s))   URINALYSIS W/ RFLX MICROSCOPIC    Collection Time: 10/21/21  4:29 PM   Result Value Ref Range    Color YELLOW      Appearance CLEAR      Specific gravity 1.007 1.005 - 1.030      pH (UA) 7.0 5.0 - 8.0      Protein Negative NEG mg/dL    Glucose Negative NEG mg/dL    Ketone Negative NEG mg/dL    Bilirubin Negative NEG      Blood Negative NEG      Urobilinogen 0.2 0.2 - 1.0 EU/dL    Nitrites Negative NEG      Leukocyte Esterase Negative NEG     HCG URINE, QL    Collection Time: 10/21/21  4:29 PM   Result Value Ref Range    HCG urine, QL Negative NEG     SARS-COV-2    Collection Time: 10/21/21  4:32 PM   Result Value Ref Range    SARS-CoV-2 Nasopharyngeal     EKG, 12 LEAD, INITIAL    Collection Time: 10/21/21  4:34 PM   Result Value Ref Range    Ventricular Rate 92 BPM    Atrial Rate 92 BPM    P-R Interval 162 ms    QRS Duration 82 ms    Q-T Interval 380 ms    QTC Calculation (Bezet) 469 ms    Calculated P Axis 45 degrees    Calculated R Axis 1 degrees    Calculated T Axis 27 degrees    Diagnosis       Normal sinus rhythm  Possible Left atrial enlargement  Borderline ECG  No previous ECGs available         Radiologic Studies -   No orders to display     CT Results  (Last 48 hours)    None        CXR Results  (Last 48 hours)    None            Medical Decision Making   I am the first provider for this patient. I reviewed the vital signs, available nursing notes, past medical history, past surgical history, family history and social history. Vital Signs-Reviewed the patient's vital signs. Records Reviewed: Nursing Notes and Old Medical Records     Procedures: None   Procedures    Provider Notes (Medical Decision Making):     Differential Diagnosis:  influenza, mononucleosis, acute bronchitis, URI, streptococcal pharyngitis, pneumonia, asthma exacerbation, allergic rhinitis, seasonal allergies, COVID, arrhythmia, UTI, pregnancy    Plan: At a precaution we will order UA and urine pregnancy.   Will swab for Covid. Will order EKG.    5:31 PM  UA and urine pregnancy are negative. Have discussed concerns for Covid or other viral type illness with patient. Have discussed importance of symptomatic relief at home. Will discharge home with Zofran, Tylenol and Motrin. Have advised home isolation, rest and hydration. Have advised primary care follow-up. Will discharge home. MED RECONCILIATION:  No current facility-administered medications for this encounter. Current Outpatient Medications   Medication Sig    ondansetron (Zofran ODT) 4 mg disintegrating tablet 1 Tablet by SubLINGual route every eight (8) hours as needed for Nausea or Vomiting.  ibuprofen (MOTRIN) 600 mg tablet Take 1 Tablet by mouth every six (6) hours as needed for Pain.  acetaminophen (TYLENOL) 325 mg tablet Take 2 Tablets by mouth every four (4) hours as needed for Pain. Disposition:  Home     DISCHARGE NOTE:   Pt has been reexamined. Patient has no new complaints, changes, or physical findings. Care plan outlined and precautions discussed. Results of workup were reviewed with the patient. All medications were reviewed with the patient. All of pt's questions and concerns were addressed. Patient was instructed and agrees to follow up with PCP as well as to return to the ED upon further deterioration. Patient is ready to go home. Follow-up Information     [facility]  As needed [address] [facility]  [phone]    Please follow-up in 1-2 days with your PCP              Current Discharge Medication List      START taking these medications    Details   ondansetron (Zofran ODT) 4 mg disintegrating tablet 1 Tablet by SubLINGual route every eight (8) hours as needed for Nausea or Vomiting.   Qty: 20 Tablet, Refills: 0  Start date: 10/21/2021      ibuprofen (MOTRIN) 600 mg tablet Take 1 Tablet by mouth every six (6) hours as needed for Pain. Qty: 20 Tablet, Refills: 0  Start date: 10/21/2021      acetaminophen (TYLENOL) 325 mg tablet Take 2 Tablets by mouth every four (4) hours as needed for Pain. Qty: 20 Tablet, Refills: 0  Start date: 10/21/2021                 Diagnosis     Clinical Impression:   1. Suspected COVID-19 virus infection    2. Negative pregnancy test          \"Please note that this dictation was completed with [employer], the computer voice recognition software. Quite often unanticipated grammatical, syntax, homophones, and other interpretive errors are inadvertently transcribed by the computer software. Please disregard these errors. Please excuse any errors that have escaped final proofreading. \"

## 2021-10-21 NOTE — Clinical Note
[address] [facility] EMERGENCY DEPT  [id number] [address] [phone]    Work/School Note    Date: 10/21/2021     To Whom It May concern:    Jed Cabezas was evaulated by the following provider(s):  Attending Provider: Roselia Manning MD  Physician Assistant: Stefani Kim virus is suspected. Per the CDC guidelines we recommend home isolation until the following conditions are all met:    1. At least 10 days have passed since symptoms first appeared and  2. At least 24 hours have passed since last fever without the use of fever-reducing medications and  3.  Symptoms (e.g., cough, shortness of breath) have improved    Sincerely,          STACEY Roman

## 2021-10-22 ENCOUNTER — PATIENT OUTREACH (OUTPATIENT)
Dept: CASE MANAGEMENT | Age: 21
End: 2021-10-22

## 2021-10-22 LAB
ATRIAL RATE: 92 BPM
CALCULATED P AXIS, ECG09: 45 DEGREES
CALCULATED R AXIS, ECG10: 1 DEGREES
CALCULATED T AXIS, ECG11: 27 DEGREES
DIAGNOSIS, 93000: NORMAL
P-R INTERVAL, ECG05: 162 MS
Q-T INTERVAL, ECG07: 380 MS
QRS DURATION, ECG06: 82 MS
QTC CALCULATION (BEZET), ECG08: 469 MS
SARS-COV-2, NAA: NOT DETECTED
VENTRICULAR RATE, ECG03: 92 BPM

## 2021-10-26 ENCOUNTER — HOSPITAL ENCOUNTER (EMERGENCY)
Age: 21
Discharge: HOME | End: 2021-10-26
Attending: EMERGENCY MEDICINE
Payer: COMMERCIAL

## 2021-10-26 ENCOUNTER — APPOINTMENT (OUTPATIENT)
Dept: GENERAL RADIOLOGY | Age: 21
End: 2021-10-26
Attending: EMERGENCY MEDICINE
Payer: COMMERCIAL

## 2021-10-26 VITALS
WEIGHT: 135 LBS | TEMPERATURE: 97.9 F | HEIGHT: 63 IN | BODY MASS INDEX: 23.92 KG/M2 | DIASTOLIC BLOOD PRESSURE: 72 MMHG | HEART RATE: 79 BPM | SYSTOLIC BLOOD PRESSURE: 124 MMHG | RESPIRATION RATE: 14 BRPM | OXYGEN SATURATION: 97 %

## 2021-10-26 DIAGNOSIS — M25.562 ACUTE PAIN OF LEFT KNEE: Primary | ICD-10-CM

## 2021-10-26 PROCEDURE — 99283 EMERGENCY DEPT VISIT LOW MDM: CPT

## 2021-10-26 PROCEDURE — 73562 X-RAY EXAM OF KNEE 3: CPT

## 2021-10-26 PROCEDURE — 73564 X-RAY EXAM KNEE 4 OR MORE: CPT

## 2021-10-26 NOTE — Clinical Note
[address] [facility] EMERGENCY DEPT  [address] [address] [phone]    Work/School Note    Date: 10/26/2021    To Whom It May concern:    Alessio Joseph was seen and treated today in the emergency room by the following provider(s):  Attending Provider: Dawit Mcmillan MD.      Alessio Joseph is excused from work/school on 10/26/21 and 10/27/21. She is medically clear to return to work/school on 10/28/2021.        Sincerely,          Jarrett Naylor MD

## 2021-10-27 ENCOUNTER — TELEPHONE (OUTPATIENT)
Dept: ORTHOPEDIC SURGERY | Age: 21
End: 2021-10-27

## 2021-10-27 ENCOUNTER — OFFICE VISIT (OUTPATIENT)
Dept: ORTHOPEDIC SURGERY | Age: 21
End: 2021-10-27
Payer: COMMERCIAL

## 2021-10-27 VITALS
HEART RATE: 98 BPM | HEIGHT: 63 IN | BODY MASS INDEX: 23.92 KG/M2 | TEMPERATURE: 98.9 F | WEIGHT: 135 LBS | OXYGEN SATURATION: 98 %

## 2021-10-27 DIAGNOSIS — S83.242A TEAR OF MEDIAL MENISCUS OF LEFT KNEE, CURRENT, UNSPECIFIED TEAR TYPE, INITIAL ENCOUNTER: Primary | ICD-10-CM

## 2021-10-27 PROCEDURE — 99213 OFFICE O/P EST LOW 20 MIN: CPT | Performed by: ORTHOPAEDIC SURGERY

## 2021-10-27 NOTE — TELEPHONE ENCOUNTER
Patient called stating she was last seen 11/2020 for a left knee meniscus tear, and at that appointment she was advised she would need surgery. Patient stated her knee healed, however recently had pain and went to [facility] ED yesterday for her left knee. She stated the pain feels like how it did when she tore her meniscus, and is scheduled to come in 11/9. She stated she is currently in a wheelchair due to the pain she is in. Can she be fit in to the schedule for a sooner appointment?     Patient can be reached at [phone]

## 2021-10-27 NOTE — PROGRESS NOTES
Henrry Yates  2000   Chief Complaint   Patient presents with    Knee Pain     left knee injury 10/26/21        HISTORY OF PRESENT ILLNESS  Henrry Yates is a 19 y.o. female who presents today for reevaluation of left knee pain. She was last seen by Dr. Kristian Delaney 6 months ago and told she has a meniscal pathology noted on her MRI and surgery was recommended. She started feeling better and decided not to have surgery at the time. She comes in today after having a couple episodes where it felt like her knee popped out of place. Her latest episode happened yesterday so she went to the ER where xrays were taken and negative. She feels like her knee pops and locks up. She can not bear weight on her leg today and has to use crutches. The pain is sharp and located on the medial aspect of her knee. Her pain is similar to previously when she was diagnosed with a medial meniscus tear. She rates the pain 8/10 today. She has taken tylenol, iced and elevated to help with pain. She would like to get back on Dr. Adriana Tellez schedule to have surgery since her pain has returned. Patient denies any fever, chills, chest pain, shortness of breath or calf pain. There are no new illness or injuries to report since last seen in the office. No changes in medications, allergies, social or family history. PHYSICAL EXAM:   Visit Vitals  Pulse 98   Temp 98.9 °F (37.2 °C) (Temporal)   Ht 5' 3\" (1.6 m)   Wt 135 lb (61.2 kg)   LMP 10/05/2021 (Approximate) Comment: Had neg test last visit   SpO2 98%   BMI 23.91 kg/m²     The patient is a well-developed, well-nourished female   in no acute distress. The patient is alert and oriented times three. Mood and affect are normal.  LYMPHATIC: lymph nodes are not enlarged and are within normal limits  SKIN: normal in color and non tender to palpation. There are no bruises or abrasions noted. NEUROLOGICAL: Motor sensory exam is within normal limits. Reflexes are equal bilaterally. There is normal sensation to pinprick and light touch  MUSCULOSKELETAL:    Examination Left knee   Skin Intact   Range of motion -   Effusion mild   Medial joint line tenderness -   Lateral joint line tenderness -   Tenderness Pes Bursa -   Tenderness insertion MCL -   Tenderness insertion LCL -   Mayis + medial click and pain   Patella crepitus -   Patella grind -   Lachman -   Pivot shift -   Anterior drawer -   Posterior drawer -   Varus stress -   Valgus stress -   Neurovascular Intact   Calf Swelling and Tenderness to Palpation -   Jayme's Test -   Hamstring Cord Tightness -       IMAGING: no new imaging today. xrays reviewed from the ER which do not show any acute bony abnormalities    IMPRESSION:      ICD-10-CM ICD-9-CM    1. Tear of medial meniscus of left knee, current, unspecified tear type, initial encounter  S83.242A 836.0         PLAN:   Pt having left knee pain. I think her pain is coming from her medial meniscus tear. She is having mechanical symptoms and meniscal pain on exam.  I have recommended conservative treatment for her acute pain - rest, ice, elevation and OTC NSAIDs. She would like to consider surgery again since her pain has returned and gotten worse. She will follow up with Dr. Lisa Avina to discuss this. Pt given a note to remain out of work for the next few days to rest and recover.   RTC PRN        [facility]

## 2021-10-27 NOTE — ED PROVIDER NOTES
EMERGENCY DEPARTMENT HISTORY AND PHYSICAL EXAM    11:09 PM  Date: 10/26/2021  Patient Name: Jacqualine Councilman    History of Presenting Illness       History Provided By:     HPI: Jacqualine Councilman is a 19 y.o. female with history of left knee meniscus tear presents with sensation of a dislocated knee on the left. Patient states that she was bending her knee when she felt that it popped out of place. Patient states that she has some knee instability and that she thinks that her knee popped in and out a few days ago. She states that she is supposed to have surgery for meniscus but has not had it yet. PCP: None    Past History     Past Medical History:  Past Medical History:   Diagnosis Date    History of torn meniscus of left knee        Past Surgical History:  No past surgical history on file. Family History:  No family history on file. Social History:  Social History     Tobacco Use    Smoking status: Former Smoker    Smokeless tobacco: Never Used   Substance Use Topics    Alcohol use: Yes     Comment: occ    Drug use: Yes     Types: Marijuana       Allergies:  No Known Allergies    Review of Systems   Review of Systems   Constitutional: Negative for activity change, appetite change and chills. HENT: Negative for congestion, ear discharge, ear pain and sore throat. Eyes: Negative for photophobia and pain. Respiratory: Negative for cough and choking. Cardiovascular: Negative for palpitations and leg swelling. Gastrointestinal: Negative for anal bleeding and rectal pain. Endocrine: Negative for polydipsia and polyuria. Genitourinary: Negative for genital sores and urgency. Musculoskeletal: Negative for arthralgias and myalgias. Neurological: Negative for dizziness, seizures and speech difficulty. Psychiatric/Behavioral: Negative for hallucinations, self-injury and suicidal ideas.         Physical Exam     Patient Vitals for the past 12 hrs:   Temp Pulse Resp BP SpO2 10/26/21 2237 97.9 °F (36.6 °C) 79 14 124/72 97 %       Physical Exam  Vitals and nursing note reviewed. Constitutional:       Appearance: She is well-developed. HENT:      Head: Normocephalic and atraumatic. Eyes:      General:         Right eye: No discharge. Left eye: No discharge. Cardiovascular:      Rate and Rhythm: Normal rate and regular rhythm. Heart sounds: Normal heart sounds. No murmur heard. Pulmonary:      Effort: Pulmonary effort is normal. No respiratory distress. Breath sounds: Normal breath sounds. No stridor. No wheezing or rales. Chest:      Chest wall: No tenderness. Abdominal:      General: Bowel sounds are normal. There is no distension. Palpations: Abdomen is soft. Tenderness: There is no abdominal tenderness. There is no guarding or rebound. Musculoskeletal:         General: Normal range of motion. Cervical back: Normal range of motion and neck supple. Comments: Left knee tenderness along medial joint line, no swelling or deformity   Skin:     General: Skin is warm and dry. Neurological:      Mental Status: She is alert and oriented to person, place, and time. Sensory: No sensory deficit. Motor: No weakness. Diagnostic Study Results     Labs -  No results found for this or any previous visit (from the past 12 hour(s)). Radiologic Studies -   No results found. Medical Decision Making     ED Course: Progress Notes, Reevaluation, and Consults:    11:09 PM Initial assessment performed. The patients presenting problems have been discussed, and they/their family are in agreement with the care plan formulated and outlined with them. I have encouraged them to ask questions as they arise throughout their visit.       Provider Notes (Medical Decision Making):   Patient presents with left knee pain and left medial joint tenderness  No obvious deformity  Neurovascularly intact  Full range of movement  No dislocation on x-ray as interpreted by me  Patient given advice analgesia, knee immobilizer and crutches  Advised to follow-up with orthopedics          Vital Signs-Reviewed the patient's vital signs. Reviewed pt's pulse ox reading. Records Reviewed: old medical records  -I am the first provider for this patient.  -I reviewed the vital signs, available nursing notes, past medical history, past surgical history, family history and social history. Current Outpatient Medications   Medication Sig Dispense Refill    ondansetron (Zofran ODT) 4 mg disintegrating tablet 1 Tablet by SubLINGual route every eight (8) hours as needed for Nausea or Vomiting. (Patient not taking: Reported on 10/26/2021) 20 Tablet 0    ibuprofen (MOTRIN) 600 mg tablet Take 1 Tablet by mouth every six (6) hours as needed for Pain. (Patient not taking: Reported on 10/26/2021) 20 Tablet 0    acetaminophen (TYLENOL) 325 mg tablet Take 2 Tablets by mouth every four (4) hours as needed for Pain. (Patient not taking: Reported on 10/26/2021) 20 Tablet 0        Clinical Impression     Clinical Impression: No diagnosis found. Disposition:    Pt has been reexamined. Patient has no new complaints, changes, or physical findings. Care plan outlined and precautions discussed. Results were reviewed with the patient. All medications were reviewed with the patient; will d/c home with PMD f/u. All of pt's questions and concerns were addressed. Patient was instructed and agrees to follow up with PMD, as well as to return to the ED upon further deterioration. Patient is ready to go home. This note was dictated utilizing voice recognition software which may lead to typographical errors. I apologize in advance if the situation occurs. If questions arise please do not hesitate to contact me or call our department. This note was dictated utilizing voice recognition software which may lead to typographical errors.   I apologize in advance if the situation occurs. If questions arise please do not hesitate to contact me or call our department.     Lupillo Jones MD  11:09 PM

## 2021-10-27 NOTE — ROUTINE PROCESS
Holly Mcbride is a 19 y.o. female that was discharged in stable. Pt was accompanied by friend. Pt is not driving. The patients diagnosis, condition and treatment were explained to  patient and aftercare instructions were given. The patient verbalized understanding. Patient armband removed and shredded.

## 2021-10-27 NOTE — TELEPHONE ENCOUNTER
Message given to the [facility] to contact patient to offer an appointment with PA Willette Homans today since she is at [facility] today.

## 2021-10-27 NOTE — LETTER
NOTIFICATION RETURN TO WORK / SCHOOL    10/27/2021 3:48 PM    Ms. Yuliet HUI [address] [address]      To Whom It May Concern:    Alessio Joseph is currently under the care of [address] [place] [address] Kevan Alba. She will return to work nov. 1 2021. Please excuse from work until nov 1 2021. If there are questions or concerns please have the patient contact our office.         Sincerely,      STACEY Mcneal

## 2021-10-29 ENCOUNTER — TELEPHONE (OUTPATIENT)
Dept: ORTHOPEDIC SURGERY | Age: 21
End: 2021-10-29

## 2021-10-29 NOTE — LETTER
NOTIFICATION RETURN TO WORK / SCHOOL    10/29/2021 2:01 PM    Ms. Yuliet HUI [address] [address]      To Whom It May Concern:    [facility] is currently under the care of [facility] Alba. She will return to work on November 1, 2021 with no restrictions. If there are questions or concerns please have the patient contact our office.         Sincerely,      STACEY Isaac

## 2021-10-29 NOTE — TELEPHONE ENCOUNTER
Patient called stating her employer is requiring a more detailed note explaining in detail what her restrictions are at work. She stated she can retrieve this letter on My Chart. Please advise patient when completed at [phone].

## 2021-11-09 ENCOUNTER — OFFICE VISIT (OUTPATIENT)
Dept: ORTHOPEDIC SURGERY | Age: 21
End: 2021-11-09
Payer: COMMERCIAL

## 2021-11-09 VITALS — TEMPERATURE: 97.8 F | HEART RATE: 61 BPM | OXYGEN SATURATION: 99 %

## 2021-11-09 DIAGNOSIS — M25.562 ACUTE PAIN OF LEFT KNEE: Primary | ICD-10-CM

## 2021-11-09 PROCEDURE — 99214 OFFICE O/P EST MOD 30 MIN: CPT | Performed by: ORTHOPAEDIC SURGERY

## 2021-11-09 RX ORDER — MELOXICAM 7.5 MG/1
7.5 TABLET ORAL DAILY
Qty: 30 TABLET | Refills: 1 | Status: SHIPPED | OUTPATIENT
Start: 2021-11-09 | End: 2021-12-21

## 2021-11-09 NOTE — PROGRESS NOTES
Henrry Yates  2000   Chief Complaint   Patient presents with    Knee Pain     Left        HISTORY OF PRESENT ILLNESS  Henrry Yates is a 19 y.o. female who presents today for evaluation of left knee pain since last week. Patient has had significant amount of problems starting a few weeks before with a number of episodes where he felt like the knee gave way along with the occasional locking sensation associated with slight swelling. Pain is worse with activity better with rest and at this time has not been able to extend her knee. Is been using crutches. Current Outpatient Medications   Medication Sig Dispense Refill    ibuprofen (MOTRIN) 600 mg tablet Take 1 Tablet by mouth every six (6) hours as needed for Pain. 20 Tablet 0    ondansetron (Zofran ODT) 4 mg disintegrating tablet 1 Tablet by SubLINGual route every eight (8) hours as needed for Nausea or Vomiting. (Patient not taking: Reported on 10/26/2021) 20 Tablet 0    acetaminophen (TYLENOL) 325 mg tablet Take 2 Tablets by mouth every four (4) hours as needed for Pain.  (Patient not taking: Reported on 11/9/2021) 20 Tablet 0      No Known Allergies   Social History     Socioeconomic History    Marital status: SINGLE     Spouse name: Not on file    Number of children: Not on file    Years of education: Not on file    Highest education level: Not on file   Occupational History    Not on file   Tobacco Use    Smoking status: Former Smoker    Smokeless tobacco: Never Used   Substance and Sexual Activity    Alcohol use: Yes     Comment: occ    Drug use: Yes     Types: Marijuana    Sexual activity: Not on file   Other Topics Concern    Not on file   Social History Narrative    Not on file     Social Determinants of Health     Financial Resource Strain:     Difficulty of Paying Living Expenses: Not on file   Food Insecurity:     Worried About [address] in the Last Year: Not on file    [address] in the Last Year: Not on file   Transportation Needs:     Lack of Transportation (Medical): Not on file    Lack of Transportation (Non-Medical): Not on file   Physical Activity:     Days of Exercise per Week: Not on file    Minutes of Exercise per Session: Not on file   Stress:     Feeling of Stress : Not on file   Social Connections:     Frequency of Communication with Friends and Family: Not on file    Frequency of Social Gatherings with Friends and Family: Not on file    Attends Voodoo Services: Not on file    Active Member of [address] [place] [address] [employer] or Organizations: Not on file    Attends Club or Organization Meetings: Not on file    Marital Status: Not on file   Intimate Partner Violence:     Fear of Current or Ex-Partner: Not on file    Emotionally Abused: Not on file    Physically Abused: Not on file    Sexually Abused: Not on file   Housing Stability:     Unable to Pay for Housing in the Last Year: Not on file    Number of [place] in the Last Year: Not on file    Unstable Housing in the Last Year: Not on file        [address]   Patient denies: Weight loss, Fever/Chills, HA, Visual changes, Fatigue, Chest pain, SOB, Abdominal pain, N/V/D/C, Blood in stool or urine, Edema. Pertinent positive as above in HPI. All others were negative    PHYSICAL EXAM:   Visit Vitals  Pulse 61   Temp 97.8 °F (36.6 °C) (Temporal)   SpO2 99%     The patient is a well-developed, well-nourished female   in no acute distress. The patient is alert and oriented times three. The patient is alert and oriented times three. Mood and affect are normal.  LYMPHATIC: lymph nodes are not enlarged and are within normal limits  SKIN: normal in color and non tender to palpation. There are no bruises or abrasions noted. NEUROLOGICAL: Motor sensory exam is within normal limits. Reflexes are equal bilaterally.  There is normal sensation to pinprick and light touch  MUSCULOSKELETAL:  Inspection diffuse swelling with range of motion 30 to 120 degrees with pain with positive Lachman and positive anterior drawer    PROCEDURE: None    RADIOGRAPHS/X-RAYS: X-rays obtained 2 views of the left knee on 10/26/2021 at [facility] show no acute abnormalities    IMPRESSION:      ICD-10-CM ICD-9-CM    1. Tear of medial meniscus of left knee, current, unspecified tear type, initial encounter  S83.242A 836.0         PLAN: At this time we will proceed with a stat MRI as my concern is that she has an ACL tear and therefore an instability.   We will continue range of motion exercises place her on Mobic and we will see her back after the MRI      Scribed by Fortunato Daniel ([address]) as dictated by MD Kenya Kwok M.D.   [facility]

## 2021-11-17 ENCOUNTER — HOSPITAL ENCOUNTER (OUTPATIENT)
Age: 21
Discharge: HOME | End: 2021-11-17
Attending: ORTHOPAEDIC SURGERY
Payer: COMMERCIAL

## 2021-11-17 DIAGNOSIS — M25.562 ACUTE PAIN OF LEFT KNEE: ICD-10-CM

## 2021-11-17 PROCEDURE — 73721 MRI JNT OF LWR EXTRE W/O DYE: CPT

## 2021-11-23 ENCOUNTER — OFFICE VISIT (OUTPATIENT)
Dept: ORTHOPEDIC SURGERY | Age: 21
End: 2021-11-23
Payer: COMMERCIAL

## 2021-11-23 VITALS — TEMPERATURE: 97.8 F | WEIGHT: 146.7 LBS | HEART RATE: 76 BPM | BODY MASS INDEX: 25.99 KG/M2 | OXYGEN SATURATION: 97 %

## 2021-11-23 DIAGNOSIS — S83.512A RUPTURE OF ANTERIOR CRUCIATE LIGAMENT OF LEFT KNEE, INITIAL ENCOUNTER: Primary | ICD-10-CM

## 2021-11-23 DIAGNOSIS — S83.242A TEAR OF MEDIAL MENISCUS OF LEFT KNEE, CURRENT, UNSPECIFIED TEAR TYPE, INITIAL ENCOUNTER: ICD-10-CM

## 2021-11-23 PROCEDURE — 99214 OFFICE O/P EST MOD 30 MIN: CPT | Performed by: ORTHOPAEDIC SURGERY

## 2021-11-23 RX ORDER — HYDROCODONE BITARTRATE AND ACETAMINOPHEN 7.5; 325 MG/1; MG/1
1 TABLET ORAL
Qty: 40 TABLET | Refills: 0 | Status: SHIPPED | OUTPATIENT
Start: 2021-11-23 | End: 2021-12-03

## 2021-11-23 NOTE — PROGRESS NOTES
HISTORY AND PHYSICAL              Patient: Joi Dickson       MRN: [id number] SSN: [id number]      YOB: 2000      AGE: 19 y.o. SEX: female           Patient scheduled for:  Arthroscopic ACL reconstruction  Surgeon: Hermelinda Pennington MD     ANESTHESIA TYPE:  General     HISTORY:     The patient was seen in the office today for a preoperative history and physical for an upcoming above listed surgery. The patient is a pleasant 19 y.o. female who has a history of left knee pain. Patient rates pain as 7/10 today. Patient has had significant amount of problems starting a few weeks before with a number of episodes where she felt like the knee gave way along with the occasional locking sensation associated with slight swelling. Pain is worse with activity better with rest and at this time has not been able to extend her knee. She is ambulating on one crutch today. She is weight bearing today but cannot walk without crutches. Due to the current findings, affected activity of daily living and continued pain and discomfort, surgical intervention is indicated. The alternatives, risks, and complications, including but not limited to infection, blood loss, need for blood transfusion, neurovascular damage, rosemary-incisional numbness, subcutaneous hematoma, bone fracture, anesthetic complications, DVT, PE, death, RSD, postoperative stiffness and pain, possible surgical scar, delayed healing and nonhealing, reflexive sympathetic dystrophy, damage to blood vessels and nerves, need for more surgery, MI, and stroke,  failure of hardware, gait disturbances,have been discussed. The patient understands and wishes to proceed with surgery.     PAST MEDICAL HISTORY:      Past Medical History:   Diagnosis Date    History of torn meniscus of left knee        CURRENT MEDICATIONS:     Current Outpatient Medications on File Prior to Visit   Medication Sig Dispense Refill    meloxicam (MOBIC) 7.5 mg tablet Take 1 Tablet by mouth daily. 30 Tablet 1    ondansetron (Zofran ODT) 4 mg disintegrating tablet 1 Tablet by SubLINGual route every eight (8) hours as needed for Nausea or Vomiting. (Patient not taking: Reported on 10/26/2021) 20 Tablet 0    ibuprofen (MOTRIN) 600 mg tablet Take 1 Tablet by mouth every six (6) hours as needed for Pain. 20 Tablet 0    acetaminophen (TYLENOL) 325 mg tablet Take 2 Tablets by mouth every four (4) hours as needed for Pain. (Patient not taking: Reported on 11/9/2021) 20 Tablet 0     No current facility-administered medications on file prior to visit. ALLERGIES:     No Known Allergies     SURGICAL HISTORY:       History reviewed. No pertinent surgical history. FAMILY HISTORY:    History reviewed. No pertinent family history. REVIEW OF SYSTEMS:      Negative for fevers, chills, chest pain, shortness of breath, weight loss, recent illness      General: Negative for fever and chills. No unexpected change in weight. Denies fatigue. No change in appetite. Skin: Negative for rash or itching. HEENT: Negative for congestion, sore throat, neck pain and neck stiffness. No change in vision or hearing. Hasn't noted any enlarged lymph nodes in the neck. Cardiovascular:  Negative for chest pain and palpitations. Has not noted pedal edema. Respiratory: Negative for cough, colds, sinus, hemoptysis, shortness of breath and wheezing. Gastrointestinal: Negative for nausea and vomiting, rectal bleeding, coffee ground emesis, abdominal pain, diarrhea and constipation. Genitourinary: Negative for dysuria, frequency urgency, or burning on micturition. No flank pain, no foul smelling urine, no difficulty with initiating urination. Hematological: Negative for bleeding or easy bruising. Musculoskeletal: Negative  for arthralgias, back pain or neck pain. Neurological: Negative for dizziness, seizures or syncopal episodes. Denies headaches.    Endocrine: Denies excessive thirst.  No heat/cold intolerance. Psychiatric: Negative for depression or insomnia. PHYSICAL EXAMINATION:      VITALS:   Visit Vitals  Pulse 76   Temp 97.8 °F (36.6 °C) (Temporal)   Wt 146 lb 11.2 oz (66.5 kg)   SpO2 97%   BMI 25.99 kg/m²        GEN:  Well developed, well nourished 19 y.o. @ in no acute distress. HEENT: Normocephalic and atraumatic. Eyes: Conjunctivae and EOM are normal.Pupils are equal, round, and reactive to light. External ear normal appearance, external nose normal appearing. Mouth/Throat: Oropharynx is clear and moist, able to handle oral secretions w/out difficulty, airway patent  NECK: Supple. Normal ROM, No lymphadenopathy. Trachea is midline. No bruising, swelling or deformity  RESP: Clear to auscultation bilaterally. No wheezes, rales, rhonchi. Normal effort and breath sounds. No respiratory distress  CARDIO:  Normal rate, regular rhythm and normal heart sounds. No MGR. ABDOMEN: Soft, non-tender, non-distended, normoactive bowel sounds in all four quadrants. There is no tenderness. There is no rebound and no guarding. BACK: No CVA or spinal tenderness  BREAST:  Deferred  PELVIC:    Deferred   RECTAL:  Deferred   :           Deferred  EXTREMITIES: EXAMINATION OF: left knee     Examination Left knee   Skin Intact   Range of motion    Effusion +   Medial joint line tenderness +   Lateral joint line tenderness -   Tenderness Pes Bursa -   Tenderness insertion MCL -   Tenderness insertion LCL -   Mayis -   Patella crepitus -   Patella grind -   Lachman +   Pivot shift -   Anterior drawer -   Posterior drawer -   Varus stress -   Valgus stress -   Neurovascular Intact   Calf Swelling and Tenderness to Palpation -   Jayme's Test -   Hamstring Cord Tightness -        NEUROVASCULAR: Sensation intact to light touch and strength grossly intact and symmetrical. No nystagmus. Positive distal pulses and capillary refill. DVT ASSESSMENT:  There is not  calf tenderness.  No evidence of DVT seen on physical exam.  MOTOR: In tact  PSYCH: Alert an oriented to person, place and time. Mood, memory, affect, behavior and judgment normal        RADIOGRAPHS & DIAGNOSTIC STUDIES:      MRI of the left knee dated 11/17/2021 was reviewed and read by Dr. Shauna El:   IMPRESSION   1.  Partial tear of ACL tendon with intrasubstance signal, suggesting near  full-thickness tearing. 2.  Complex horizontal tear of medial meniscus, extending from anterior to  posterior horn, with additional bucket-handle morphology with redundant meniscus  in the medial joint. 3.  Moderate joint effusion       X-rays obtained 2 views of the left knee on 10/26/2021 at [facility] show no acute abnormalities          PLAN:      I discussed the risks and benefits and potential adverse outcomes of both operative vs non operative treatment of left knee ACL tear with the patient and patient wishes to proceed with Arthroscopic ACL reconstruction. Risks of operative intervention include but not limited to bleeding, infection, deep vein thrombosis, pulmonary embolism, death, limb length discrepancy, reflexive sympathetic dystrophy, fat embolism syndrome,damage to blood vessels and nerves, malunion, non-union, delayed union, failure of hardware, post traumatic arthritis, stroke, heart attack, and death. Patient understands that infection may arise and may require numerous surgeries. The patient was counseled at length about the risks of shakila Covid-19 during their perioperative period and any recovery window from their procedure. The patient was made aware that shakila Covid-19  may worsen their prognosis for recovering from their procedure and lend to a higher morbidity and/or mortality risk. All material risks, benefits, and reasonable alternatives including postponing the procedure were discussed. The patient does  wish to proceed with the procedure at this time. Risk factors include: n/a  2.  No ultrasound exam indicated today  3. No cortisone injection indicated today   4. No Physical/Occupational Therapy indicated today  5. No diagnostic test indicated today:   6. No durable medical equipment indicated today  7. No referral indicated today   8. No medications indicated today:   9. Yes Narcotic indicated today for short term acute pain secondary to ACL reconstruction. Patient given pain medication for short term acute pain relief. Goal is to treat patient according to above plan and to ultimately have patient off all pain medications once appropriate. If chronic pain management is required beyond what is expected for current orthopedic problem, will refer patient to pain management.  was reviewed and will be reviewed with every medication refill request.         Scribed by Gisell Mccall Retort) as dictated by Luz Rose MD    I, Dr. Luz Rose, confirm that all documentation is accurate.     Luz Rose M.D.   [facility]

## 2021-12-20 ENCOUNTER — OFFICE VISIT (OUTPATIENT)
Dept: ORTHOPEDIC SURGERY | Age: 21
End: 2021-12-20

## 2021-12-20 VITALS
BODY MASS INDEX: 24.92 KG/M2 | OXYGEN SATURATION: 98 % | WEIGHT: 135.4 LBS | HEART RATE: 56 BPM | HEIGHT: 62 IN | TEMPERATURE: 97.6 F | SYSTOLIC BLOOD PRESSURE: 112 MMHG | DIASTOLIC BLOOD PRESSURE: 80 MMHG

## 2021-12-20 DIAGNOSIS — S83.242A TEAR OF MEDIAL MENISCUS OF LEFT KNEE, CURRENT, UNSPECIFIED TEAR TYPE, INITIAL ENCOUNTER: ICD-10-CM

## 2021-12-20 DIAGNOSIS — S83.512A RUPTURE OF ANTERIOR CRUCIATE LIGAMENT OF LEFT KNEE, INITIAL ENCOUNTER: Primary | ICD-10-CM

## 2021-12-20 RX ORDER — CELECOXIB 100 MG/1
400 CAPSULE ORAL ONCE
Status: CANCELLED | OUTPATIENT
Start: 2021-12-20 | End: 2021-12-20

## 2021-12-20 RX ORDER — OXYCODONE HYDROCHLORIDE 5 MG/1
5 TABLET ORAL
Qty: 40 TABLET | Refills: 0 | Status: SHIPPED | OUTPATIENT
Start: 2021-12-20 | End: 2021-12-27

## 2021-12-20 RX ORDER — ACETAMINOPHEN 325 MG/1
1000 TABLET ORAL ONCE
Status: CANCELLED | OUTPATIENT
Start: 2021-12-20 | End: 2021-12-20

## 2021-12-20 RX ORDER — GABAPENTIN 300 MG/1
300 CAPSULE ORAL
Qty: 5 CAPSULE | Refills: 0 | Status: SHIPPED | OUTPATIENT
Start: 2021-12-20 | End: 2021-12-25

## 2021-12-20 RX ORDER — GABAPENTIN 100 MG/1
400 CAPSULE ORAL ONCE
Status: CANCELLED | OUTPATIENT
Start: 2021-12-20 | End: 2021-12-20

## 2021-12-20 RX ORDER — ONDANSETRON 4 MG/1
4 TABLET, FILM COATED ORAL
Qty: 60 TABLET | Refills: 0 | Status: SHIPPED | OUTPATIENT
Start: 2021-12-20

## 2021-12-20 NOTE — H&P
HISTORY AND PHYSICAL          Patient: Afshan Moore                MRN: [id number]       SSN: [id number]  YOB: 2000          AGE: 19 y.o. SEX: female      Patient scheduled for: Left knee arthroscopic ACL reconstruction with partial medial meniscectomy  Surgeon: Mitul Duarte MD    ANESTHESIA TYPE:  General    HISTORY:     The patient was seen in the office today for a preoperative history and physical for an upcoming above listed surgery. The patient is a pleasant 19 y.o. female who has a history of left knee pain. Patient rates pain as 7/10 today. Patient has had significant amount of problems starting a few weeks before with a number of episodes where she felt like the knee gave way along with the occasional locking sensation associated with slight swelling.  Pain is worse with activity better with rest and at this time has not been able to extend her knee. She is ambulating on one crutch today. She is weight bearing today but cannot walk without crutches. Due to the current findings, affected activity of daily living and continued pain and discomfort, surgical intervention is indicated. The alternatives, risks, and complications, including but not limited to infection, blood loss, need for blood transfusion, neurovascular damage, rosemary-incisional numbness, subcutaneous hematoma, bone fracture, anesthetic complications, DVT, PE, death, RSD, postoperative stiffness and pain, possible surgical scar, delayed healing and nonhealing, reflexive sympathetic dystrophy, damage to blood vessels and nerves, need for more surgery, MI, and stroke,  failure of hardware, gait disturbances,have been discussed. The patient understands and wishes to proceed with surgery.      PAST MEDICAL HISTORY:     Past Medical History:   Diagnosis Date    History of torn meniscus of left knee        CURRENT MEDICATIONS:     Current Outpatient Medications   Medication Sig Dispense Refill    meloxicam (MOBIC) 7.5 mg tablet Take 1 Tablet by mouth daily. 30 Tablet 1    ondansetron (Zofran ODT) 4 mg disintegrating tablet 1 Tablet by SubLINGual route every eight (8) hours as needed for Nausea or Vomiting. (Patient not taking: Reported on 10/26/2021) 20 Tablet 0    ibuprofen (MOTRIN) 600 mg tablet Take 1 Tablet by mouth every six (6) hours as needed for Pain. 20 Tablet 0    acetaminophen (TYLENOL) 325 mg tablet Take 2 Tablets by mouth every four (4) hours as needed for Pain. (Patient not taking: Reported on 11/9/2021) 20 Tablet 0       ALLERGIES:     No Known Allergies      SURGICAL HISTORY:     No past surgical history on file. SOCIAL HISTORY:     Social History     Socioeconomic History    Marital status: SINGLE   Tobacco Use    Smoking status: Former Smoker    Smokeless tobacco: Never Used   Substance and Sexual Activity    Alcohol use: Yes     Comment: occ    Drug use: Yes     Types: Marijuana       FAMILY HISTORY:     No family history on file. REVIEW OF SYSTEMS:     Negative for fevers, chills, chest pain, shortness of breath, weight loss, recent illness     General: Negative for fever and chills. No unexpected change in weight. Denies fatigue. No change in appetite. Skin: Negative for rash or itching. HEENT: Negative for congestion, sore throat, neck pain and neck stiffness. No change in vision or hearing. Hasn't noted any enlarged lymph nodes in the neck. Cardiovascular:  Negative for chest pain and palpitations. Has not noted pedal edema. Respiratory: Negative for cough, colds, sinus, hemoptysis, shortness of breath and wheezing. Gastrointestinal: Negative for nausea and vomiting, rectal bleeding, coffee ground emesis, abdominal pain, diarrhea and constipation. Genitourinary: Negative for dysuria, frequency urgency, or burning on micturition. No flank pain, no foul smelling urine, no difficulty with initiating urination.    Hematological: Negative for bleeding or easy bruising. Musculoskeletal: Negative  for arthralgias, back pain or neck pain. Neurological: Negative for dizziness, seizures or syncopal episodes. Denies headaches. Endocrine: Denies excessive thirst.  No heat/cold intolerance. Psychiatric: Negative for depression or insomnia. PHYSICAL EXAMINATION:     VITALS: There were no vitals taken for this visit. GEN:  Well developed, well nourished 19 y.o. female in no acute distress. HEENT: Normocephalic and atraumatic. Eyes: Conjunctivae and EOM are normal.Pupils are equal, round, and reactive to light. External ear normal appearance, external nose normal appearing. Mouth/Throat: Oropharynx is clear and moist, able to handle oral secretions w/out difficulty, airway patent  NECK: Supple. Normal ROM, No lymphadenopathy. Trachea is midline. No bruising, swelling or deformity  RESP: Clear to auscultation bilaterally. No wheezes, rales, rhonchi. Normal effort and breath sounds. No respiratory distress  CARDIO:  Normal rate, regular rhythm and normal heart sounds. No MGR. ABDOMEN: Soft, non-tender, non-distended, normoactive bowel sounds in all four quadrants. There is no tenderness. There is no rebound and no guarding.    BACK: No CVA or spinal tenderness  BREAST:  Deferred  PELVIC:    Deferred   RECTAL:  Deferred   :           Deferred  EXTREMITIES: EXAMINATION OF: left knee     Examination Left knee   Skin Intact   Range of motion    Effusion +   Medial joint line tenderness +   Lateral joint line tenderness -   Tenderness Pes Bursa -   Tenderness insertion MCL -   Tenderness insertion LCL -   Mayis -   Patella crepitus -   Patella grind -   Lachman +   Pivot shift -   Anterior drawer -   Posterior drawer -   Varus stress -   Valgus stress -   Neurovascular Intact   Calf Swelling and Tenderness to Palpation -   Jayme's Test -   Hamstring Cord Tightness -        NEUROVASCULAR: Sensation intact to light touch and strength grossly intact and symmetrical. No nystagmus. Positive distal pulses and capillary refill. DVT ASSESSMENT:  There is not  calf tenderness. No evidence of DVT seen on physical exam.  MOTOR: In tact  PSYCH: Alert an oriented to person, place and time. Mood, memory, affect, behavior and judgment normal       RADIOGRAPHS & DIAGNOSTIC STUDIES:     MRI/xray reveals : MRI of the left knee dated 11/17/2021 was reviewed and read by Dr. Darya Mcgowan:   IMPRESSION   1.  Partial tear of ACL tendon with intrasubstance signal, suggesting near  full-thickness tearing. 2.  Complex horizontal tear of medial meniscus, extending from anterior to  posterior horn, with additional bucket-handle morphology with redundant meniscus  in the medial joint. 3.  Moderate joint effusion        X-rays obtained 2 views of the left knee on 10/26/2021 at [facility] show no acute abnormalities         LABS:     None required    ASSESSMENT:       Encounter Diagnoses   Name Primary?  Rupture of anterior cruciate ligament of left knee, initial encounter Yes    Tear of medial meniscus of left knee, current, unspecified tear type, initial encounter        PLAN:     Again, the alternatives, risks, and complications, as well as expected outcome were discussed. The patient understands and agrees to proceed with left knee arthroscopic ACL reconstruction with partial medial meniscectomy. The patient was counseled at length about the risks of shakila Covid-19 during their perioperative period and any recovery window from their procedure. The patient was made aware that shakila Covid-19  may worsen their prognosis for recovering from their procedure and lend to a higher morbidity and/or mortality risk. All material risks, benefits, and reasonable alternatives including postponing the procedure were discussed. The patient does  wish to proceed with the procedure at this time.    Patient given orders listed below:    No orders of the defined types were placed in this encounter.         Radha Martinez PA-C  12/20/2021  10:46 AM

## 2021-12-20 NOTE — PATIENT INSTRUCTIONS
Dr. Alvaro Watson Arthroscopic ACL Reconstruction Surgery    What is the surgery? - This is an outpatient procedure at either [place] [address] or [address] [place] [address] Troy Kelly will be completely asleep for procedure. Dr. Alvaro Watson will make 3 small incisions and one larger incision in your knee. He will take a tour of your knee with the camera and then reconstruct your ACL. - Total surgery takes about an hour and a half     What can you expect after surgery? - You will have a bulky dressing on your knee that you can remove 2 days after surgery. You will be able to shower 2 days after surgery but no soaking in a bath, hot tub, ocean or pool x 2 weeks to allow for full wound healing.   - You will have a hinged brace. You are to wear this whenever you are up and moving around. You may remove brace when you are sedentary or sleeping. It should be unlocked at all times to allow for full range of motion  - You will be on crutches or a walker when you leave the hospital. You can place your toe down for balance the first week but are essentially non-weight bearing x 1 week. We will then progress you to partial weight bearing with the goal of being off the crutches after 4-6 weeks. Plan on being in the hinged brace for 4-6 weeks as well. This may very based on the specifics of your surgery.   - Dr. Alvaro Watson will start physical therapy for you starting the first business day after surgery  - You are usually in physical therapy for 10-12 weeks  - At 6 months you are allowed to begin more athletic activities with the assist of your custom sport brace. It will take you 12 months to fully recover from your surgery. When can I return to work? - Most patients return to desk work only after 1-2 weeks. We recommend no prolonged walking or standing, climbing, kneeling, or crawling x 4-6 months. Not all arthroscopic ACL reconstructions are the same.  The specifics of your individual case will be discussed at length with you by Dr. Darya Mcgowan and his Physician Assistant. Jonathon Stern  Surgical Coordinator  [address] [place] [address]. Juan M.  [address], [email or web address]  P: [phone]  F: [phone]

## 2021-12-21 NOTE — PERIOP NOTES
PRE-SURGICAL INSTRUCTIONS        Patient's Name:  Marilynn Brooke      LPUGL'Y Date:  12/21/2021            Covid Testing Date and Time:  12/23 at 0930    Surgery Date:  12/29/2021                1. Do NOT eat or drink anything, including candy, gum, or ice chips after midnight on 12/29/21, unless you have specific instructions from your surgeon or anesthesia provider to do so.  2. You may brush your teeth before coming to the hospital.  3. No smoking 24 hours prior to the day of surgery. 4. No alcohol 24 hours prior to the day of surgery. 5. No recreational drugs for one week prior to the day of surgery. 6. Leave all valuables, including money/purse, at home. 7. Remove all jewelry, nail polish, acrylic nails, and makeup (including mascara); no lotions powders, deodorant, or perfume/cologne/after shave on the skin. 8. Follow instruction for Hibiclens washes and CHG wipes from surgeon's office. 9. Glasses/contact lenses and dentures may be worn to the hospital.  They will be removed prior to surgery. 10. Call your doctor if symptoms of a cold or illness develop within 24-48 hours prior to your surgery. 11.  If you are having an outpatient procedure, please make arrangements for a responsible ADULT TO [address] [place] [address] and stay with you for 24 hours after your surgery. 12. ONE VISITOR in the hospital at this time for outpatient procedures. Exceptions may be made for surgical admissions, per nursing unit guidelines      Special Instructions:      Bring list of CURRENT medications. Bring any pertinent legal medical records. Take these medications the morning of surgery with a sip of water:  none            On the day of surgery, come in the main entrance of [facility] in [place]. Let the  at the desk know you are there for surgery. A staff member will come escort you to the surgical area on the second floor.     If you have any questions or concerns, please do not hesitate to call:     (Prior to the day of surgery) [facility] department:  [phone]   (Day of surgery) Pre-Op department:  [phone]    These surgical instructions were reviewed with Gabriel Guzman during the [facility] phone call.

## 2021-12-22 DIAGNOSIS — Z01.818 PREOP EXAMINATION: Primary | ICD-10-CM

## 2021-12-23 ENCOUNTER — HOSPITAL ENCOUNTER (OUTPATIENT)
Dept: PREADMISSION TESTING | Age: 21
Discharge: HOME | End: 2021-12-23
Payer: COMMERCIAL

## 2021-12-23 DIAGNOSIS — Z01.818 PREOP EXAMINATION: ICD-10-CM

## 2021-12-23 LAB — SARS-COV-2, NAA: NOT DETECTED

## 2021-12-23 PROCEDURE — U0003 INFECTIOUS AGENT DETECTION BY NUCLEIC ACID (DNA OR RNA); SEVERE ACUTE RESPIRATORY SYNDROME CORONAVIRUS 2 (SARS-COV-2) (CORONAVIRUS DISEASE [COVID-19]), AMPLIFIED PROBE TECHNIQUE, MAKING USE OF HIGH THROUGHPUT TECHNOLOGIES AS DESCRIBED BY CMS-2020-01-R: HCPCS

## 2021-12-28 ENCOUNTER — ANESTHESIA EVENT (OUTPATIENT)
Dept: SURGERY | Age: 21
End: 2021-12-28
Payer: COMMERCIAL

## 2021-12-29 ENCOUNTER — ANESTHESIA (OUTPATIENT)
Dept: SURGERY | Age: 21
End: 2021-12-29
Payer: COMMERCIAL

## 2021-12-29 ENCOUNTER — HOSPITAL ENCOUNTER (OUTPATIENT)
Age: 21
Setting detail: OUTPATIENT SURGERY
Discharge: HOME | End: 2021-12-29
Attending: ORTHOPAEDIC SURGERY | Admitting: ORTHOPAEDIC SURGERY
Payer: COMMERCIAL

## 2021-12-29 VITALS
WEIGHT: 135 LBS | HEART RATE: 67 BPM | RESPIRATION RATE: 16 BRPM | BODY MASS INDEX: 23.92 KG/M2 | HEIGHT: 63 IN | OXYGEN SATURATION: 95 % | SYSTOLIC BLOOD PRESSURE: 110 MMHG | TEMPERATURE: 98.7 F | DIASTOLIC BLOOD PRESSURE: 68 MMHG

## 2021-12-29 DIAGNOSIS — S83.512A RUPTURE OF ANTERIOR CRUCIATE LIGAMENT OF LEFT KNEE, INITIAL ENCOUNTER: ICD-10-CM

## 2021-12-29 LAB
AMPHET UR QL SCN: NEGATIVE
BARBITURATES UR QL SCN: NEGATIVE
BENZODIAZ UR QL: NEGATIVE
CANNABINOIDS UR QL SCN: POSITIVE
COCAINE UR QL SCN: NEGATIVE
HCG UR QL: NEGATIVE
HDSCOM,HDSCOM: ABNORMAL
METHADONE UR QL: NEGATIVE
OPIATES UR QL: NEGATIVE
PCP UR QL: NEGATIVE

## 2021-12-29 PROCEDURE — 2709999900 HC NON-CHARGEABLE SUPPLY: Performed by: ORTHOPAEDIC SURGERY

## 2021-12-29 PROCEDURE — 77030002922 HC SUT FBRWRE ARTH -B: Performed by: ORTHOPAEDIC SURGERY

## 2021-12-29 PROCEDURE — 74011000250 HC RX REV CODE- 250: Performed by: PHYSICIAN ASSISTANT

## 2021-12-29 PROCEDURE — 76210000006 HC OR PH I REC 0.5 TO 1 HR: Performed by: ORTHOPAEDIC SURGERY

## 2021-12-29 PROCEDURE — 74011250637 HC RX REV CODE- 250/637: Performed by: PHYSICIAN ASSISTANT

## 2021-12-29 PROCEDURE — 74011250637 HC RX REV CODE- 250/637: Performed by: NURSE ANESTHETIST, CERTIFIED REGISTERED

## 2021-12-29 PROCEDURE — C1713 ANCHOR/SCREW BN/BN,TIS/BN: HCPCS | Performed by: ORTHOPAEDIC SURGERY

## 2021-12-29 PROCEDURE — 74011250636 HC RX REV CODE- 250/636: Performed by: ANESTHESIOLOGY

## 2021-12-29 PROCEDURE — 74011250636 HC RX REV CODE- 250/636: Performed by: PHYSICIAN ASSISTANT

## 2021-12-29 PROCEDURE — 74011250636 HC RX REV CODE- 250/636: Performed by: NURSE ANESTHETIST, CERTIFIED REGISTERED

## 2021-12-29 PROCEDURE — 74011000250 HC RX REV CODE- 250: Performed by: NURSE ANESTHETIST, CERTIFIED REGISTERED

## 2021-12-29 PROCEDURE — 77030002933 HC SUT MCRYL J&J -A: Performed by: ORTHOPAEDIC SURGERY

## 2021-12-29 PROCEDURE — 01400 ANES OPN/ARTHRS KNEE JT NOS: CPT | Performed by: ANESTHESIOLOGY

## 2021-12-29 PROCEDURE — 76010000131 HC OR TIME 2 TO 2.5 HR: Performed by: ORTHOPAEDIC SURGERY

## 2021-12-29 PROCEDURE — 77030031139 HC SUT VCRL2 J&J -A: Performed by: ORTHOPAEDIC SURGERY

## 2021-12-29 PROCEDURE — 81025 URINE PREGNANCY TEST: CPT

## 2021-12-29 PROCEDURE — 76060000035 HC ANESTHESIA 2 TO 2.5 HR: Performed by: ORTHOPAEDIC SURGERY

## 2021-12-29 PROCEDURE — 77030019605: Performed by: ORTHOPAEDIC SURGERY

## 2021-12-29 PROCEDURE — 77030040922 HC BLNKT HYPOTHRM STRY -A: Performed by: ORTHOPAEDIC SURGERY

## 2021-12-29 PROCEDURE — 77030013079 HC BLNKT BAIR HGGR 3M -A: Performed by: ANESTHESIOLOGY

## 2021-12-29 PROCEDURE — 74011250637 HC RX REV CODE- 250/637: Performed by: ORTHOPAEDIC SURGERY

## 2021-12-29 PROCEDURE — 29888 ARTHRS AID ACL RPR/AGMNTJ: CPT | Performed by: ORTHOPAEDIC SURGERY

## 2021-12-29 PROCEDURE — 77030033005 HC TBNG ARTHSC PMP STRY -B: Performed by: ORTHOPAEDIC SURGERY

## 2021-12-29 PROCEDURE — 77030040361 HC SLV COMPR DVT MDII -B: Performed by: ORTHOPAEDIC SURGERY

## 2021-12-29 PROCEDURE — 77030008509 HC TBNG IN/OUT FLO LEMV -B: Performed by: ORTHOPAEDIC SURGERY

## 2021-12-29 PROCEDURE — 77030008683 HC TU ET CUF COVD -A: Performed by: ANESTHESIOLOGY

## 2021-12-29 PROCEDURE — 77030003601 HC NDL NRV BLK BBMI -A: Performed by: ORTHOPAEDIC SURGERY

## 2021-12-29 PROCEDURE — 77030020274 HC MISC IMPL ORTHOPEDIC: Performed by: ORTHOPAEDIC SURGERY

## 2021-12-29 PROCEDURE — 77030026438 HC STYL ET INTUB CARD -A: Performed by: ANESTHESIOLOGY

## 2021-12-29 PROCEDURE — 77030002919 HC SUT FBRTAPE ARTH -B: Performed by: ORTHOPAEDIC SURGERY

## 2021-12-29 PROCEDURE — 01400 ANES OPN/ARTHRS KNEE JT NOS: CPT | Performed by: NURSE ANESTHETIST, CERTIFIED REGISTERED

## 2021-12-29 PROCEDURE — 29888 ARTHRS AID ACL RPR/AGMNTJ: CPT | Performed by: PHYSICIAN ASSISTANT

## 2021-12-29 PROCEDURE — 80307 DRUG TEST PRSMV CHEM ANLYZR: CPT

## 2021-12-29 PROCEDURE — 76210000021 HC REC RM PH II 0.5 TO 1 HR: Performed by: ORTHOPAEDIC SURGERY

## 2021-12-29 PROCEDURE — 77030027384 HC PRB ARTHSCP SERFAS STRY -C: Performed by: ORTHOPAEDIC SURGERY

## 2021-12-29 PROCEDURE — 76942 ECHO GUIDE FOR BIOPSY: CPT | Performed by: ANESTHESIOLOGY

## 2021-12-29 PROCEDURE — 74011250637 HC RX REV CODE- 250/637: Performed by: ANESTHESIOLOGY

## 2021-12-29 PROCEDURE — 77030004453 HC BUR SHV STRY -B: Performed by: ORTHOPAEDIC SURGERY

## 2021-12-29 PROCEDURE — 77030022041 HC CUTR FLIP ARTH -D: Performed by: ORTHOPAEDIC SURGERY

## 2021-12-29 PROCEDURE — 64450 NJX AA&/STRD OTHER PN/BRANCH: CPT | Performed by: ANESTHESIOLOGY

## 2021-12-29 PROCEDURE — 77030020754 HC CUF TRNQT 2BLA STRY -B: Performed by: ORTHOPAEDIC SURGERY

## 2021-12-29 PROCEDURE — 29881 ARTHRS KNE SRG MNISECTMY M/L: CPT | Performed by: ORTHOPAEDIC SURGERY

## 2021-12-29 DEVICE — TIGHTROPE ® II RT WITH DEPLOYING SUTURE
Type: IMPLANTABLE DEVICE | Site: KNEE | Status: FUNCTIONAL
Brand: ARTHREX®

## 2021-12-29 RX ORDER — MIDAZOLAM HYDROCHLORIDE 1 MG/ML
INJECTION, SOLUTION INTRAMUSCULAR; INTRAVENOUS AS NEEDED
Status: DISCONTINUED | OUTPATIENT
Start: 2021-12-29 | End: 2021-12-29 | Stop reason: HOSPADM

## 2021-12-29 RX ORDER — LIDOCAINE HYDROCHLORIDE 20 MG/ML
INJECTION, SOLUTION EPIDURAL; INFILTRATION; INTRACAUDAL; PERINEURAL AS NEEDED
Status: DISCONTINUED | OUTPATIENT
Start: 2021-12-29 | End: 2021-12-29 | Stop reason: HOSPADM

## 2021-12-29 RX ORDER — SUCCINYLCHOLINE CHLORIDE 20 MG/ML
INJECTION INTRAMUSCULAR; INTRAVENOUS AS NEEDED
Status: DISCONTINUED | OUTPATIENT
Start: 2021-12-29 | End: 2021-12-29 | Stop reason: HOSPADM

## 2021-12-29 RX ORDER — FENTANYL CITRATE 50 UG/ML
50 INJECTION, SOLUTION INTRAMUSCULAR; INTRAVENOUS
Status: DISCONTINUED | OUTPATIENT
Start: 2021-12-29 | End: 2021-12-29 | Stop reason: HOSPADM

## 2021-12-29 RX ORDER — GLYCOPYRROLATE 0.2 MG/ML
INJECTION INTRAMUSCULAR; INTRAVENOUS AS NEEDED
Status: DISCONTINUED | OUTPATIENT
Start: 2021-12-29 | End: 2021-12-29 | Stop reason: HOSPADM

## 2021-12-29 RX ORDER — FAMOTIDINE 20 MG/1
20 TABLET, FILM COATED ORAL ONCE
Status: COMPLETED | OUTPATIENT
Start: 2021-12-29 | End: 2021-12-29

## 2021-12-29 RX ORDER — ROPIVACAINE HYDROCHLORIDE 2 MG/ML
INJECTION, SOLUTION EPIDURAL; INFILTRATION; PERINEURAL
Status: COMPLETED | OUTPATIENT
Start: 2021-12-29 | End: 2021-12-29

## 2021-12-29 RX ORDER — NEOSTIGMINE METHYLSULFATE 1 MG/ML
INJECTION, SOLUTION INTRAVENOUS AS NEEDED
Status: DISCONTINUED | OUTPATIENT
Start: 2021-12-29 | End: 2021-12-29 | Stop reason: HOSPADM

## 2021-12-29 RX ORDER — HYDROMORPHONE HYDROCHLORIDE 2 MG/ML
INJECTION, SOLUTION INTRAMUSCULAR; INTRAVENOUS; SUBCUTANEOUS AS NEEDED
Status: DISCONTINUED | OUTPATIENT
Start: 2021-12-29 | End: 2021-12-29 | Stop reason: HOSPADM

## 2021-12-29 RX ORDER — ONDANSETRON 2 MG/ML
INJECTION INTRAMUSCULAR; INTRAVENOUS AS NEEDED
Status: DISCONTINUED | OUTPATIENT
Start: 2021-12-29 | End: 2021-12-29 | Stop reason: HOSPADM

## 2021-12-29 RX ORDER — FENTANYL CITRATE 50 UG/ML
100 INJECTION, SOLUTION INTRAMUSCULAR; INTRAVENOUS ONCE
Status: COMPLETED | OUTPATIENT
Start: 2021-12-29 | End: 2021-12-29

## 2021-12-29 RX ORDER — CELECOXIB 400 MG/1
400 CAPSULE ORAL ONCE
Status: COMPLETED | OUTPATIENT
Start: 2021-12-29 | End: 2021-12-29

## 2021-12-29 RX ORDER — OXYCODONE HYDROCHLORIDE 5 MG/1
5 TABLET ORAL
Status: COMPLETED | OUTPATIENT
Start: 2021-12-29 | End: 2021-12-29

## 2021-12-29 RX ORDER — ACETAMINOPHEN 500 MG
1000 TABLET ORAL ONCE
Status: COMPLETED | OUTPATIENT
Start: 2021-12-29 | End: 2021-12-29

## 2021-12-29 RX ORDER — FENTANYL CITRATE 50 UG/ML
INJECTION, SOLUTION INTRAMUSCULAR; INTRAVENOUS AS NEEDED
Status: DISCONTINUED | OUTPATIENT
Start: 2021-12-29 | End: 2021-12-29 | Stop reason: HOSPADM

## 2021-12-29 RX ORDER — SODIUM CHLORIDE, SODIUM LACTATE, POTASSIUM CHLORIDE, CALCIUM CHLORIDE 600; 310; 30; 20 MG/100ML; MG/100ML; MG/100ML; MG/100ML
75 INJECTION, SOLUTION INTRAVENOUS CONTINUOUS
Status: DISCONTINUED | OUTPATIENT
Start: 2021-12-29 | End: 2021-12-29 | Stop reason: HOSPADM

## 2021-12-29 RX ORDER — ONDANSETRON 2 MG/ML
4 INJECTION INTRAMUSCULAR; INTRAVENOUS ONCE
Status: DISCONTINUED | OUTPATIENT
Start: 2021-12-29 | End: 2021-12-29 | Stop reason: HOSPADM

## 2021-12-29 RX ORDER — HYDROMORPHONE HYDROCHLORIDE 2 MG/ML
0.25 INJECTION, SOLUTION INTRAMUSCULAR; INTRAVENOUS; SUBCUTANEOUS AS NEEDED
Status: DISCONTINUED | OUTPATIENT
Start: 2021-12-29 | End: 2021-12-29 | Stop reason: HOSPADM

## 2021-12-29 RX ORDER — SODIUM CHLORIDE 0.9 % (FLUSH) 0.9 %
5-40 SYRINGE (ML) INJECTION EVERY 8 HOURS
Status: DISCONTINUED | OUTPATIENT
Start: 2021-12-29 | End: 2021-12-29 | Stop reason: HOSPADM

## 2021-12-29 RX ORDER — LIDOCAINE HYDROCHLORIDE 10 MG/ML
3 INJECTION, SOLUTION EPIDURAL; INFILTRATION; INTRACAUDAL; PERINEURAL ONCE
Status: COMPLETED | OUTPATIENT
Start: 2021-12-29 | End: 2021-12-29

## 2021-12-29 RX ORDER — SODIUM CHLORIDE 0.9 % (FLUSH) 0.9 %
5-40 SYRINGE (ML) INJECTION AS NEEDED
Status: DISCONTINUED | OUTPATIENT
Start: 2021-12-29 | End: 2021-12-29 | Stop reason: HOSPADM

## 2021-12-29 RX ORDER — ROCURONIUM BROMIDE 10 MG/ML
INJECTION, SOLUTION INTRAVENOUS AS NEEDED
Status: DISCONTINUED | OUTPATIENT
Start: 2021-12-29 | End: 2021-12-29 | Stop reason: HOSPADM

## 2021-12-29 RX ORDER — GABAPENTIN 400 MG/1
400 CAPSULE ORAL ONCE
Status: COMPLETED | OUTPATIENT
Start: 2021-12-29 | End: 2021-12-29

## 2021-12-29 RX ORDER — MIDAZOLAM HYDROCHLORIDE 1 MG/ML
2 INJECTION, SOLUTION INTRAMUSCULAR; INTRAVENOUS ONCE
Status: COMPLETED | OUTPATIENT
Start: 2021-12-29 | End: 2021-12-29

## 2021-12-29 RX ORDER — DEXAMETHASONE SODIUM PHOSPHATE 4 MG/ML
INJECTION, SOLUTION INTRA-ARTICULAR; INTRALESIONAL; INTRAMUSCULAR; INTRAVENOUS; SOFT TISSUE AS NEEDED
Status: DISCONTINUED | OUTPATIENT
Start: 2021-12-29 | End: 2021-12-29 | Stop reason: HOSPADM

## 2021-12-29 RX ORDER — PROPOFOL 10 MG/ML
INJECTION, EMULSION INTRAVENOUS AS NEEDED
Status: DISCONTINUED | OUTPATIENT
Start: 2021-12-29 | End: 2021-12-29 | Stop reason: HOSPADM

## 2021-12-29 RX ORDER — SCOLOPAMINE TRANSDERMAL SYSTEM 1 MG/1
1 PATCH, EXTENDED RELEASE TRANSDERMAL
Status: DISCONTINUED | OUTPATIENT
Start: 2021-12-29 | End: 2021-12-29 | Stop reason: HOSPADM

## 2021-12-29 RX ORDER — ROPIVACAINE HYDROCHLORIDE 2 MG/ML
30 INJECTION, SOLUTION EPIDURAL; INFILTRATION; PERINEURAL
Status: COMPLETED | OUTPATIENT
Start: 2021-12-29 | End: 2021-12-29

## 2021-12-29 RX ADMIN — MIDAZOLAM 2 MG: 1 INJECTION INTRAMUSCULAR; INTRAVENOUS at 07:13

## 2021-12-29 RX ADMIN — MIDAZOLAM HYDROCHLORIDE 2 MG: 2 INJECTION, SOLUTION INTRAMUSCULAR; INTRAVENOUS at 07:38

## 2021-12-29 RX ADMIN — ROPIVACAINE HYDROCHLORIDE 60 MG: 2 INJECTION, SOLUTION EPIDURAL; INFILTRATION at 07:22

## 2021-12-29 RX ADMIN — GABAPENTIN 400 MG: 400 CAPSULE ORAL at 07:00

## 2021-12-29 RX ADMIN — PROPOFOL 150 MG: 10 INJECTION, EMULSION INTRAVENOUS at 07:46

## 2021-12-29 RX ADMIN — ROPIVACAINE HYDROCHLORIDE 30 ML: 2 INJECTION, SOLUTION EPIDURAL; INFILTRATION at 07:22

## 2021-12-29 RX ADMIN — ROCURONIUM BROMIDE 30 MG: 50 INJECTION INTRAVENOUS at 07:58

## 2021-12-29 RX ADMIN — HYDROMORPHONE HYDROCHLORIDE 0.2 MG: 2 INJECTION, SOLUTION INTRAMUSCULAR; INTRAVENOUS; SUBCUTANEOUS at 09:43

## 2021-12-29 RX ADMIN — HYDROMORPHONE HYDROCHLORIDE 0.2 MG: 2 INJECTION, SOLUTION INTRAMUSCULAR; INTRAVENOUS; SUBCUTANEOUS at 09:00

## 2021-12-29 RX ADMIN — FENTANYL CITRATE 100 MCG: 50 INJECTION INTRAMUSCULAR; INTRAVENOUS at 07:13

## 2021-12-29 RX ADMIN — OXYCODONE HYDROCHLORIDE 5 MG: 5 TABLET ORAL at 11:24

## 2021-12-29 RX ADMIN — HYDROMORPHONE HYDROCHLORIDE 0.2 MG: 2 INJECTION, SOLUTION INTRAMUSCULAR; INTRAVENOUS; SUBCUTANEOUS at 09:28

## 2021-12-29 RX ADMIN — SUCCINYLCHOLINE CHLORIDE 80 MG: 20 INJECTION, SOLUTION INTRAMUSCULAR; INTRAVENOUS at 07:47

## 2021-12-29 RX ADMIN — Medication 3 MG: at 09:40

## 2021-12-29 RX ADMIN — HYDROMORPHONE HYDROCHLORIDE 0.2 MG: 2 INJECTION, SOLUTION INTRAMUSCULAR; INTRAVENOUS; SUBCUTANEOUS at 09:12

## 2021-12-29 RX ADMIN — LIDOCAINE HYDROCHLORIDE 60 MG: 20 INJECTION, SOLUTION EPIDURAL; INFILTRATION; INTRACAUDAL; PERINEURAL at 07:46

## 2021-12-29 RX ADMIN — HYDROMORPHONE HYDROCHLORIDE 0.2 MG: 2 INJECTION, SOLUTION INTRAMUSCULAR; INTRAVENOUS; SUBCUTANEOUS at 09:50

## 2021-12-29 RX ADMIN — SODIUM CHLORIDE, SODIUM LACTATE, POTASSIUM CHLORIDE, AND CALCIUM CHLORIDE 75 ML/HR: 600; 310; 30; 20 INJECTION, SOLUTION INTRAVENOUS at 06:32

## 2021-12-29 RX ADMIN — HYDROMORPHONE HYDROCHLORIDE 0.2 MG: 2 INJECTION, SOLUTION INTRAMUSCULAR; INTRAVENOUS; SUBCUTANEOUS at 09:38

## 2021-12-29 RX ADMIN — HYDROMORPHONE HYDROCHLORIDE 0.25 MG: 2 INJECTION, SOLUTION INTRAMUSCULAR; INTRAVENOUS; SUBCUTANEOUS at 10:27

## 2021-12-29 RX ADMIN — GLYCOPYRROLATE 0.4 MG: 0.2 INJECTION INTRAMUSCULAR; INTRAVENOUS at 09:40

## 2021-12-29 RX ADMIN — ONDANSETRON 4 MG: 2 INJECTION INTRAMUSCULAR; INTRAVENOUS at 09:40

## 2021-12-29 RX ADMIN — LIDOCAINE HYDROCHLORIDE 2 ML: 10 INJECTION, SOLUTION EPIDURAL; INFILTRATION; INTRACAUDAL; PERINEURAL at 07:13

## 2021-12-29 RX ADMIN — DEXAMETHASONE SODIUM PHOSPHATE 4 MG: 4 INJECTION, SOLUTION INTRAMUSCULAR; INTRAVENOUS at 07:52

## 2021-12-29 RX ADMIN — CELECOXIB 400 MG: 400 CAPSULE ORAL at 07:01

## 2021-12-29 RX ADMIN — FENTANYL CITRATE 100 MCG: 50 INJECTION, SOLUTION INTRAMUSCULAR; INTRAVENOUS at 07:46

## 2021-12-29 RX ADMIN — ACETAMINOPHEN 1000 MG: 500 TABLET ORAL at 07:00

## 2021-12-29 RX ADMIN — ROCURONIUM BROMIDE 10 MG: 50 INJECTION INTRAVENOUS at 08:39

## 2021-12-29 RX ADMIN — SODIUM CHLORIDE, SODIUM LACTATE, POTASSIUM CHLORIDE, AND CALCIUM CHLORIDE: 600; 310; 30; 20 INJECTION, SOLUTION INTRAVENOUS at 09:50

## 2021-12-29 RX ADMIN — FAMOTIDINE 20 MG: 20 TABLET ORAL at 07:01

## 2021-12-29 RX ADMIN — WATER 2 G: 1 INJECTION INTRAMUSCULAR; INTRAVENOUS; SUBCUTANEOUS at 07:52

## 2021-12-29 NOTE — BRIEF OP NOTE
Brief Postoperative Note    Patient: Lee Ann Lund  YOB: 2000  MRN: [id number]    Date of Procedure: 12/29/2021     Pre-Op Diagnosis: S83.512A, V59.806V RUPTURE OF ANTERIOR CRUCIATE LIGAMENT OF LEFT KNEE, INITIAL ENCOUNTER, TEAR OF MEDIAL MENISCUS OF LEFT KNEE, CURRENT, USPECIFIED TEAR TYPE, INITIAL ENCOUNTER    Post-Op Diagnosis: Same as preoperative diagnosis. Procedure(s):  LEFT KNEE ARTHROSCOPIC ANTERIOR CRUCIATE LIGAMENT RECONSTRUCTION WITH PARTIAL MEDIAL MENISCECTOMY/ARTHREX    Surgeon(s):  Maggi Mead MD    Surgical Assistant: Surg Asst-1: Sheila WEST    Anesthesia: General     Estimated Blood Loss (mL): Minimal    Complications: None    Specimens: * No specimens in log *     Implants:   Implant Name Type Inv.  Item Serial No.  Lot No. LRB No. Used Action   SYSTEM TIGHTROPE II RT W/ DEPLOYING SUTURE - [id number]  SYSTEM TIGHTROPE II RT W/ DEPLOYING SUTURE  [address] [employer] [employer] [id number] Left 1 Implanted   SYSTEM TIGHTROPE II RT W/ DEPLOYING SUTURE - [id number]  SYSTEM TIGHTROPE II RT W/ DEPLOYING SUTURE  [address] [employer] [employer] [id number] Left 1 Implanted       Drains: * No LDAs found *    Findings: As above    Electronically Signed by Selena Sosa MD on 12/29/2021 at 9:45 AM

## 2021-12-29 NOTE — ANESTHESIA PROCEDURE NOTES
Peripheral Block    Start time: 12/29/2021 7:12 AM  End time: 12/29/2021 7:22 AM  Performed by: Dirk Hernadez MD  Authorized by: Dirk Hernadez MD       Pre-procedure:    Indications: at surgeon's request and post-op pain management    Preanesthetic Checklist: patient identified, risks and benefits discussed, site marked, timeout performed, anesthesia consent given and patient being monitored    Timeout Time: 07:12 EST          Block Type:   Block Type:  Femoral single shot  Laterality:  Left  Monitoring:  Standard ASA monitoring, responsive to questions, oxygen, continuous pulse ox, frequent vital sign checks and heart rate  Injection Technique:  Single shot  Procedures: ultrasound guided and nerve stimulator    Patient Position: supine  Prep: chlorhexidine    Location:  Upper thigh  Needle Type:  Stimuplex  Needle Gauge:  21 G  Needle Localization:  Ultrasound guidance and nerve stimulator  Motor Response comment:   Motor Response: minimal motor response >0.4 mA   Medication Injected:  Ropivacaine (NAROPIN) 2 mg/mL (0.2 %) injection, 60 mg  Med Admin Time: 12/29/2021 7:22 AM    Assessment:  Number of attempts:  1  Injection Assessment:  Incremental injection every 5 mL, negative aspiration for CSF, no paresthesia, ultrasound image on chart, local visualized surrounding nerve on ultrasound, negative aspiration for blood and no intravascular symptoms  Patient tolerance:  Patient tolerated the procedure well with no immediate complications

## 2021-12-29 NOTE — ANESTHESIA POSTPROCEDURE EVALUATION
Procedure(s):  LEFT KNEE ARTHROSCOPIC ANTERIOR CRUCIATE LIGAMENT RECONSTRUCTION WITH PARTIAL MEDIAL MENISCECTOMY/ ARTHREX.    general, regional    Anesthesia Post Evaluation      Multimodal analgesia: multimodal analgesia used between 6 hours prior to anesthesia start to PACU discharge  Patient location during evaluation: PACU  Patient participation: complete - patient participated  Level of consciousness: awake and alert  Pain management: adequate  Airway patency: patent  Anesthetic complications: no  Cardiovascular status: acceptable  Respiratory status: acceptable  Hydration status: acceptable  Post anesthesia nausea and vomiting:  none  Final Post Anesthesia Temperature Assessment:  Normothermia (36.0-37.5 degrees C)      INITIAL Post-op Vital signs:   Vitals Value Taken Time   /56 12/29/21 1041   Temp 37.1 °C (98.8 °F) 12/29/21 1001   Pulse 61 12/29/21 1048   Resp 14 12/29/21 1048   SpO2 97 % 12/29/21 1048   Vitals shown include unvalidated device data.

## 2021-12-29 NOTE — OP NOTES
Operative Report    Patient: Caitlin Hernandez MRN: [id number]  SSN: [id number]    YOB: 2000  Age: 19 y.o. Sex: female       Date of Surgery: 12/29/2021     Preoperative Diagnosis: S83.512A, S83.242A RUPTURE OF ANTERIOR CRUCIATE LIGAMENT OF LEFT KNEE, INITIAL ENCOUNTER, TEAR OF MEDIAL MENISCUS OF LEFT KNEE, CURRENT, USPECIFIED TEAR TYPE, INITIAL ENCOUNTER     Postoperative Diagnosis: S83.512A, E59.761I RUPTURE OF ANTERIOR CRUCIATE LIGAMENT OF LEFT KNEE, INITIAL ENCOUNTER, TEAR OF MEDIAL MENISCUS OF LEFT KNEE, CURRENT, USPECIFIED TEAR TYPE, INITIAL ENCOUNTER     Surgeon(s) and Role:     Yohannes Delgado MD - Primary  Assistant BRIA Seth instrumental in managing the arthroscope while soft tissue and bony work is done and preparing the graft therefore medically necessary for the success of the procedure  Anesthesia: General     Procedure: Procedure(s):  LEFT KNEE ARTHROSCOPIC ANTERIOR CRUCIATE LIGAMENT RECONSTRUCTION WITH PARTIAL MEDIAL MENISCECTOMY/ARTHREX     Procedure in Detail: Patient was taken to the operating room to some general trach anesthesia with anesthesia staff. Placed in a standard arthroscopy ruth left leg prepped with ChloraPrep solution draped free sterile field. Anterolateral portal was used on arthroscopy portal , medial portals were portal with portals made with 11 blade followed by blunt trochars. Once the arthroscope was in place the knee was systematically evaluated standard suprapatellar pouch patellofemoral joint were no changes were noted. In the medial compartment of bucket-handle tear of the medial meniscus was found for which a anterior aspect of the tear was attached with a arthroscopic scissors and then the meniscus was debrided using a shaver 4 mm shaver leaving a stable rim. Lateral compartment no evidence of tears articular surface was intact.   Anterior crucial ligament is torn the remnants of the ligament were debrided using 4 mm shaver. Very narrow notch was noted for which an notchplasty was performed using a 4 mm bur. Attention was then placed through the anterior medial aspect of the knee through a 3 cm incision semitendinosus was dissected from surrounding tissues using a tendon stripper was stripped taken to the back table converting into a 4 strand hamstring graft with tight ropes on each and prepared by Gustavo Arango. While this was done the femoral tunnel was used with a retrograde gait guide at the 3 o'clock position with a flip cutter performing a 17 rather 18 x 8 mm tunnel with a passing suture through a stab incision laterally and on the tibial side at 65 degrees with the tibial guide in the middle of the tibial stump and 8 mm x 30 mm tunnel passing suture passing suture was passed through the anterior medial portal through this a tight rope and femoral side was passed and flipped and then the graft brought into the tunnel 18 mm then retrograde into the tibial side and tension with a tight rope effecting very stable graft. Graft was free of impingement through all planes of motion. The subcutaneous tissues were closed with 3-0 Vicryl and the skin with 4-0 Monocryl. Sterile dressings were applied patient tolerated procedure well was taken to recovery room without problems      Estimated Blood Loss: Minimal    Tourniquet Time: * Missing tourniquet times found for documented tourniquets in lo *      Implants:   Implant Name Type Inv.  Item Serial No.  Lot No. LRB No. Used Action   SYSTEM TIGHTROPE II RT W/ DEPLOYING SUTURE - [id number]  SYSTEM TIGHTROPE II RT W/ DEPLOYING SUTURE  [address] [employer] [employer] [id number] Left 1 Implanted   SYSTEM TIGHTROPE II RT W/ DEPLOYING SUTURE - [id number]  SYSTEM TIGHTROPE II RT W/ DEPLOYING SUTURE  [address] [employer] [id number] Left 1 Implanted               Specimens: * No specimens in log *        Drains: None                Complications: None    Counts: Sponge and needle counts were correct times two.     Signed By:  Woodrow No MD     December 29, 2021

## 2021-12-29 NOTE — BRIEF OP NOTE
Brief Postoperative Note    Patient: Deon Alejandra  YOB: 2000  MRN: [id number]    Date of Procedure: 12/29/2021     Pre-Op Diagnosis: S83.512A, K81.438C RUPTURE OF ANTERIOR CRUCIATE LIGAMENT OF LEFT KNEE, INITIAL ENCOUNTER, TEAR OF MEDIAL MENISCUS OF LEFT KNEE, CURRENT, USPECIFIED TEAR TYPE, INITIAL ENCOUNTER    Post-Op Diagnosis: Same as preoperative diagnosis. Procedure(s):  LEFT KNEE ARTHROSCOPIC ANTERIOR CRUCIATE LIGAMENT RECONSTRUCTION WITH PARTIAL MEDIAL MENISCECTOMY  Surgeon(s):  Christo Merida MD    Surgical Assistant: Physician Assistant: STACEY Danielson  Surg Asst-1: Ariana WEST    Anesthesia: General     Estimated Blood Loss (mL): Minimal    Complications: None    Specimens: * No specimens in log *     Implants:   Implant Name Type Inv.  Item Serial No.  Lot No. LRB No. Used Action   SYSTEM TIGHTROPE II RT W/ DEPLOYING SUTURE - [id number]  SYSTEM TIGHTROPE II RT W/ DEPLOYING SUTURE  [address] [employer] [employer] [id number] Left 1 Implanted   SYSTEM TIGHTROPE II RT W/ DEPLOYING SUTURE - [id number]  SYSTEM TIGHTROPE II RT W/ DEPLOYING SUTURE  [address] [employer] [employer] [id number] Left 1 Implanted       Drains: * No LDAs found *    Findings: medial meniscus tear and acl tear    Electronically Signed by STACEY Toscano on 12/29/2021 at 10:03 AM

## 2021-12-29 NOTE — ANESTHESIA PREPROCEDURE EVALUATION
Relevant Problems   ENDOCRINE   (+) Severe obesity (HCC)       Anesthetic History   No history of anesthetic complications            Review of Systems / Medical History  Patient summary reviewed and pertinent labs reviewed    Pulmonary  Within defined limits        Smoker         Neuro/Psych   Within defined limits           Cardiovascular  Within defined limits                Exercise tolerance: >4 METS     GI/Hepatic/Renal  Within defined limits              Endo/Other  Within defined limits        Pertinent negatives: No morbid obesity   Other Findings   Comments: Marijuana frequently. UDS clear otherwise.              Physical Exam    Airway  Mallampati: I  TM Distance: 4 - 6 cm  Neck ROM: normal range of motion   Mouth opening: Normal     Cardiovascular    Rhythm: regular  Rate: normal         Dental  No notable dental hx       Pulmonary  Breath sounds clear to auscultation               Abdominal  GI exam deferred       Other Findings            Anesthetic Plan    ASA: 2  Anesthesia type: general and regional - femoral single shot      Post-op pain plan if not by surgeon: peripheral nerve block single    Induction: Intravenous  Anesthetic plan and risks discussed with: Patient

## 2021-12-29 NOTE — DISCHARGE INSTRUCTIONS
Dr. Angélica Andrade ACL   Postoperative Information    How to manage your pain after your Surgery:  After your surgery it is expected that you will have some pain. In efforts to reduce the amounts of side effects from pain medications we would like you to follow the below medication regimen after surgery:  1. Take 1- 325 mg Aspirin a day starting tomorrow for 14 straight days to help prevent blood clots  2. Take 1000mg of Tylenol by mouth every 8 hours x 5 days. This is 4 tabs of regular strength Tylenol or 2 tabs of Extra Strength Tylenol  3. 300mg of Gabapentin every night x 5 days starting the evening you get home from the hospital  DO NOT TAKE THIS IF YOU ALREADY TAKE LYRICA OR GABAPENTIN (TAKE YOUR NORMAL DOSE) OR HAVE ALLERGY TO GABAPENTIN  4. We would like you to take a NSAID medication for the first 3 days following surgery. In efforts to avoid additional prescription costs we recommend one of the following over the counter medications. 600mg of Ibuprofen every 8 hours x 3 days    OR   500mg of Naproxen (Aleve) every 12 hours x 3 days  If you have a prescription for Meloxicam or Celebrex already you may resume this as previously prescribed instead of the Over the Counter Medications. DO NOT TAKE ANY OF THESE IF YOU HAVE CHRONIC RENAL FAILURE, ARE TAKING A BLOOD THINNER, HAVE A HISTORY OF A GASTRIC BLEED OR ARE ALLERGIC TO ASPIRIN. IT IS OK TO TAKE IF YOU HAVE HISTORY OF GASTRIC BYPASS SURGERY. 5. Then ONLY IF YOUR PAIN IS UNCONTROLLED you may take your Narcotic Pain medication as prescribed. THIS IS THE PRESCRIPTION THAT WAS GIVEN TO YOU IN THE OFFICE. A soft bandage was placed on your knee to soak up blood and fluid. You may take the bandage off two days after surgery. You may have a clear bandage on your incisions that looks like tape. This is surgical superglue. Leave these on unless you are noticing redness or itching. Band-Aids should be used for the first 4-5 days over each incision.  You may re-apply the acewrap if the brace is rubbing on your incisions. You do not need to cont to wear. You also will be put in a knee brace. Please use this brace as instructed and keep it unlocked allowing for full range of motion    There are 3 small incisions and 1 larger incision in your knee that may be sore and develop bruising over the next several days. You should expect swelling in the area. You may elevate your leg and apply an icepack on top of the dressing to help minimize the swelling. Deep massage to the lower leg may also be utilized. It is safe to take a shower two days after surgery. You may begin toe touch weight bearing with crutches. Specific exercises to improve your mobility and strength will be taught to you during physical therapy when you go tomorrow. If you have a high temperature, unexpected pain, redness or swelling, or any drainage around your knee area, please call my office immediately. Please make an appointment to return to my office in one week. Dr. Dennis Rodriguez office number [phone]    Patient Education   Patient Education        Anterior Cruciate Ligament Reconstruction: What to Expect at Home  Your Recovery  Anterior cruciate ligament (ACL) surgery replaces the damaged ligament with a new ligament called a graft. In most cases, the graft is a tendon taken from your own knee or hamstring. In some cases, the graft comes from a donor. You will feel tired for several days. Your knee will be swollen. And you may have numbness around the cut (incision) on your knee. Your ankle and shin may be bruised or swollen. You can put ice on the area to reduce swelling. Most of this will go away in a few days. You should soon start seeing improvement in your knee. You may be able to return to most of your regular activities within a few weeks. But it will be several months before you have complete use of your knee.  It may take as long as 6 months to a year before your knee is ready for hard physical work or certain sports. Surgery can help. But even after surgery, your knee may not be as strong as it was before the injury. You will need to build your strength and the motion of your joint with rehabilitation (rehab) exercises. How soon you can return to sports or exercise depends on how well you follow your rehab program and how well your knee heals. Your doctor or physical therapist will give you an idea of when you can return to these activities. Most people can jog in about 4 months and run or cycle in about 4 to 6 months. You may need to wear a knee brace when you play sports. This care sheet gives you a general idea about how long it will take for you to recover. But each person recovers at a different pace. Follow the steps below to get better as quickly as possible. How can you care for yourself at home? Activity    · Rest when you feel tired. Getting enough sleep will help you recover. Sleep with your knee raised, but not bent. Put a pillow under your foot. Keep your leg raised as much as you can for the first few days.     · You can use a brace and crutches to move around the house to do daily tasks. Don't put weight on your leg without these until your doctor says it's okay. Your thigh muscles will be weak, so take your time and be safe. You will have the crutches for 1 to 2 weeks.     · If you have a brace, leave it on except when you exercise your knee or you shower. (Not all doctors use braces.) Be careful not to put the brace on too tight. You will probably need to use it for 2 to 6 weeks.     · For about 12 weeks, do not do any strenuous activity. This includes not only sports, but also things like mowing lawns, raking leaves, and shoveling snow.     · You may shower 24 to 48 hours after surgery, if your doctor okays it. When you shower, keep your bandage and incision dry by taping a sheet of plastic to cover them. It might be best to get a shower stool to sit on.  If you have a brace, only take it off if your doctor says it's okay.     · If your doctor does not want you to shower or remove your brace, you can take a sponge bath.     · Do not take a bath, swim, use a hot tub, or soak your leg for 2 weeks or until your doctor says it's okay.     · You can drive when you are no longer using crutches or a knee brace, are no longer taking prescription pain medicine, and have some control over your knee. For most people, this takes 1 to 2 weeks.     · How soon you can go back to work depends on your job. If you sit at work, you may be able to go back in 1 to 2 weeks. But if you are on your feet at work, it may take 4 to 6 weeks. If you are very physically active in your job, it may take 4 to 6 months. Diet    · You can eat your normal diet. If your stomach is upset, try bland, low-fat foods like plain rice, broiled chicken, toast, and yogurt. Drink plenty of fluids.     · You may notice that your bowel movements are not regular right after your surgery. This is common. Try to avoid constipation and straining with bowel movements. You may want to take a fiber supplement every day. If you have not had a bowel movement after a couple of days, ask your doctor about taking a mild laxative. Medicines    · Your doctor will tell you if and when you can restart your medicines. He or she will also give you instructions about taking any new medicines.     · If you take aspirin or some other blood thinner, ask your doctor if and when to start taking it again. Make sure that you understand exactly what your doctor wants you to do.     · Take pain medicines exactly as directed. ? If the doctor gave you a prescription medicine for pain, take it as prescribed. ? If you are not taking a prescription pain medicine, ask your doctor if you can take an over-the-counter medicine.     · If your doctor prescribed antibiotics, take them as directed. Do not stop taking them just because you feel better.  You need to take the full course of antibiotics.     · If you think your pain medicine is making you sick to your stomach:  ? Take your medicine after meals (unless your doctor has told you not to). ? Ask your doctor for a different pain medicine. Incision care    · If you have a bandage over your incision, keep the bandage clean and dry. Follow your doctor's instructions. Your doctor will probably want you to leave the bandage on until you come back to the office. If your doctor allows it, you may be able to remove the bandage 48 to 72 hours after your surgery.     · If you have strips of tape on the incision, leave the tape on for a week or until it falls off. Keep the area clean and dry. Exercise    · Do your rehab exercises as instructed. Exercise in a rehab program is an important part of your treatment. It will help you improve your knee's range of motion and regain your muscle strength.     · If you are given a continuous passive motion machine, use it as directed. This machine will do some of the exercises for you. You will use it for about 2 weeks. Ice and elevation    · To reduce swelling and pain, put ice or a cold pack on your knee for 10 to 20 minutes at a time. Do this every few hours. Put a thin cloth between the ice and your skin.     · For 3 days after surgery, prop up the sore leg on a pillow when you ice it or anytime you sit or lie down. Try to keep it above the level of your heart. This will help reduce swelling.     · If your doctor gave you support stockings, wear them as long as you are told to. These help prevent blood clots. Follow-up care is a key part of your treatment and safety. Be sure to make and go to all appointments, and call your doctor if you are having problems. It's also a good idea to know your test results and keep a list of the medicines you take.             Knee Arthroscopy: What to Expect at Home  Your Recovery     Arthroscopy is a way to find problems and do surgery inside a joint without making a large cut (incision). Your doctor put a lighted tube with a tiny camera--called an arthroscope, or scope--and surgical tools through small incisions in your knee. You will feel tired for several days. Your knee will be swollen. And you may notice that your skin is a different color near the cuts (incisions). The swelling is normal and will start to go away in a few days. Keeping your leg higher than your heart will help with swelling and pain. You will probably need about 6 weeks to recover. If your doctor repaired damaged tissue, recovery will take longer. You may have to limit your activity until your knee strength and movement are back to normal. You may also be in a physical rehabilitation (rehab) program.  You may be able to return to a desk job or your normal routine in a few days. But if you do physical labor, it may be as long as 2 months before you can go back to work. This care sheet gives you a general idea about how long it will take for you to recover. But each person recovers at a different pace. Follow the steps below to get better as quickly as possible. How can you care for yourself at home? Activity    · Rest when you feel tired. Getting enough sleep will help you recover. Use pillows to raise your ankle and leg above the level of your heart.     · Try to walk each day, after your doctor has said you can. Start by walking a little more than you did the day before. Bit by bit, increase the amount you walk. Walking boosts blood flow and helps prevent pneumonia and constipation.     · You may have a brace or crutches or both.     · Your doctor will tell you how often and how much you can move your leg and knee.     · If you have a desk job, you may be able to return to work a few days after the surgery.  If you lift things or stand or walk a lot at work, it may be as long as 2 months before you can return.     · You can take a shower 48 to 72 hours after surgery and clean the incisions with regular soap and water. Do not take a bath or soak your knee until your doctor says it is okay.     · Ask your doctor when you can drive again.     · If you had a repair of torn tissue, follow your doctor's instructions for lifting things or moving your knee. Diet    · You can eat your normal diet. If your stomach is upset, try bland, low-fat foods like plain rice, broiled chicken, toast, and yogurt.     · Drink plenty of fluids, unless your doctor tells you not to.     · You may notice that your bowel movements are not regular right after your surgery. This is common. Try to avoid constipation and straining with bowel movements. You may want to take a fiber supplement every day. If you have not had a bowel movement after a couple of days, ask your doctor about taking a mild laxative. Medicines    · Your doctor will tell you if and when you can restart your medicines. He or she will also give you instructions about taking any new medicines.     · If you take aspirin or some other blood thinner, ask your doctor if and when to start taking it again. Make sure that you understand exactly what your doctor wants you to do.     · Be safe with medicines. Take pain medicines exactly as directed. ? If the doctor gave you a prescription medicine for pain, take it as prescribed. ? If you are not taking a prescription pain medicine, ask your doctor if you can take an over-the-counter medicine.     · If you think your pain medicine is making you sick to your stomach:  ? Take your medicine after meals (unless your doctor has told you not to). ? Ask your doctor for a different pain medicine.     · If your doctor prescribed antibiotics, take them as directed. Do not stop taking them just because you feel better. You need to take the full course of antibiotics. Incision care    · If you have a dressing over your cuts (incisions), keep it clean and dry.  You may remove it 48 to 72 hours after the surgery.     · If your incisions are open to the air, keep the area clean and dry.     · If you have strips of tape on the incisions, leave the tape on for a week or until it falls off. Exercise    · Move your toes and ankle as much as your bandages will allow.     · Bend and straighten your knee slowly several times during the day.     · Depending on why you had the surgery, you may have to do ankle and leg exercises. Your doctor or physical therapist will give you exercises as part of a rehabilitation program.     · Stop any activity that causes sharp pain. Talk to your doctor or physical therapist about what sports or other exercise you can do. Ice    · To reduce swelling and pain, put ice or a cold pack on your knee for 10 to 20 minutes at a time. Do this every 1 to 2 hours. Put a thin cloth between the ice and your skin. Follow-up care is a key part of your treatment and safety. Be sure to make and go to all appointments, and call your doctor if you are having problems. It's also a good idea to know your test results and keep a list of the medicines you take. When should you call for help? Call 911 anytime you think you may need emergency care. For example, call if:    · You passed out (lost consciousness).     · You have severe trouble breathing.     · You have sudden chest pain and shortness of breath, or you cough up blood. Call your doctor now or seek immediate medical care if:    · Your foot or toes are numb or tingling.     · Your foot is cool or pale, or it changes color.     · You have signs of a blood clot, such as:  ? Pain in your calf, back of the knee, thigh, or groin. ?  Redness and swelling in your leg or groin.     · You are sick to your stomach or cannot keep fluids down.     · You have pain that does not get better after you take pain medicine.     · You have loose stitches, or your incision comes open.     · Bright red blood has soaked through the bandage over your incision.     · You have signs of infection, such as:  ? Increased pain, swelling, warmth, or redness. ? Red streaks leading from the incisions. ? Pus draining from the incisions. ? A fever. Watch closely for any changes in your health, and be sure to contact your doctor if:    · You do not have a bowel movement after taking a laxative. Where can you learn more? Go to http://www.gray.com/  Enter N866 in the search box to learn more about \"Knee Arthroscopy: What to Expect at Home. \"  Current as of: July 1, 2021               Content Version: 13.0  © 3799-6984 [employer]. Care instructions adapted under license by [employer] (which disclaims liability or warranty for this information). If you have questions about a medical condition or this instruction, always ask your healthcare professional. [address] any warranty or liability for your use of this information. DISCHARGE SUMMARY from Nurse    PATIENT INSTRUCTIONS:    After general anesthesia or intravenous sedation, for 24 hours or while taking prescription Narcotics:  · Limit your activities  · Do not drive and operate hazardous machinery  · Do not make important personal or business decisions  · Do  not drink alcoholic beverages  · If you have not urinated within 8 hours after discharge, please contact your surgeon on call. Report the following to your surgeon:  · Excessive pain, swelling, redness or odor of or around the surgical area  · Temperature over 100.5  · Nausea and vomiting lasting longer than 4 hours or if unable to take medications  · Any signs of decreased circulation or nerve impairment to extremity: change in color, persistent  numbness, tingling, coldness or increase pain  · Any questions    What to do at Home:      *  Please give a list of your current medications to your Primary Care Provider.     *  Please update this list whenever your medications are discontinued, doses are      changed, or new medications (including over-the-counter products) are added. *  Please carry medication information at all times in case of emergency situations. These are general instructions for a healthy lifestyle:    No smoking/ No tobacco products/ Avoid exposure to second hand smoke  Surgeon General's Warning:  Quitting smoking now greatly reduces serious risk to your health. Obesity, smoking, and sedentary lifestyle greatly increases your risk for illness    A healthy diet, regular physical exercise & weight monitoring are important for maintaining a healthy lifestyle    You may be retaining fluid if you have a history of heart failure or if you experience any of the following symptoms:  Weight gain of 3 pounds or more overnight or 5 pounds in a week, increased swelling in our hands or feet or shortness of breath while lying flat in bed. Please call your doctor as soon as you notice any of these symptoms; do not wait until your next office visit. The discharge information has been reviewed with the patient. The patient verbalized understanding. Discharge medications reviewed with the patient and appropriate educational materials and side effects teaching were provided.   ___________________________________________________________________________________________________________________________________

## 2021-12-29 NOTE — PROGRESS NOTES
Date of Surgery Update:  Faye Bell was seen and examined. History and physical has been reviewed. The patient has been examined. There have been no significant clinical changes since the completion of the originally dated History and Physical.    Signed By: Sergey Klein MD     December 29, 2021 7:33 AM       The above patient was independently seen and examined by myself. The case was then discussed and a proper diagnosis/plan was made. I agree with the above assessment.

## 2021-12-30 ENCOUNTER — HOSPITAL ENCOUNTER (OUTPATIENT)
Dept: PHYSICAL THERAPY | Age: 21
Discharge: HOME | End: 2021-12-30
Attending: PHYSICIAN ASSISTANT
Payer: COMMERCIAL

## 2021-12-30 PROCEDURE — 97161 PT EVAL LOW COMPLEX 20 MIN: CPT

## 2021-12-30 NOTE — PROGRESS NOTES
[facility]  [address]  [place], [address]  [phone] fax    Plan of Care/ Statement of Necessity for Physical Therapy Services    Patient name: Juaquin Allen Start of Care: 2021   Referral source: Sumeet Anderson : 2000    Medical Diagnosis: Left knee pain [M25.562]  Status post knee surgery [Z98.890]  Payor: [employer] / Plan: [address] / Product Type: Managed Care Medicaid /  Onset Date:2021 DOS    Treatment Diagnosis: left knee pain   Prior Hospitalization: see medical history Provider#: [id number]   Medications: Verified on Patient summary List    Comorbidities: meniscus tear 1.5 years ago, allergies   Prior Level of Function: pt was running regularly, performing barbell squats, regular gym attendance without knee pain     The Plan of Care and following information is based on the information from the initial evaluation. Assessment/ key information: Pt is a 24-year-old woman presenting to the clinic s/p a left ACL reconstruction with semitendinosus graft and a medial menisectomy on 2021. Brace is unlocked per surgeon's orders. Fair quad set noted today but requires active assist by therapist for SLR. Nerve block remains in effect for the anterior upper leg, with pain today at the graft site. Swelling is present but unmeasured secondary to bandaging. Knee PROM measures 0-17-85 deg today with empty end feels. Signs and symptoms consistent with post-operative state. Pt will benefit from receipt of skilled PT services at a frequency of 3x/week for the next 8 weeks to improve impairments in strength, ROM, flexibility, joint mobility, gait mechanics, stair negotiation ease, and ease of functional transfers.     Evaluation Complexity History MEDIUM  Complexity : 1-2 comorbidities / personal factors will impact the outcome/ POC ; Examination LOW Complexity : 1-2 Standardized tests and measures addressing body structure, function, activity limitation and / or participation in recreation  ;Presentation LOW Complexity : Stable, uncomplicated  ;Clinical Decision Making MEDIUM Complexity : FOTO score of 26-74  Overall Complexity Rating: LOW   Problem List: pain affecting function, decrease ROM, decrease strength, edema affecting function, impaired gait/ balance, decrease ADL/ functional abilitiies, decrease activity tolerance, decrease flexibility/ joint mobility and decrease transfer abilities   Treatment Plan may include any combination of the following: Therapeutic exercise, Therapeutic activities, Neuromuscular re-education, Physical agent/modality, Gait/balance training, Manual therapy, Patient education, Self Care training, Functional mobility training, Home safety training and Stair training  Patient / Family readiness to learn indicated by: asking questions, trying to perform skills and interest  Persons(s) to be included in education: patient (P)  Barriers to Learning/Limitations: None  Patient Goal (s): being able to walk normal again  Patient Self Reported Health Status: excellent  Rehabilitation Potential: excellent    Short Term Goals: To be accomplished in 2 weeks:  1. Pt will report daily compliance with HEP at least twice daily to maximize therapeutic success. 2. Pt will demonstrate SLR without assistance to improve independence with HEP. Long Term Goals: To be accomplished in 8 weeks:  1. Pt will demonstrate 10 SLR without quad lag to improve knee stability for ambulation. 2. Pt will improve her left knee strength to 4+/5 MMT to improve stability for stair negotiation and squatting. 3. Pt will improve her left knee ROM to 0-120 deg to improve gait mechanics. 4. Pt will ambulate without AD for at least 150 ft with minimal to no difficulty to improve independence with mobility. 5. Pt will improve her FOTO to 71, demonstrating improved functional ADL capacity.   Frequency / Duration: Patient to be seen 3 times per week for 8 weeks. Patient/ Caregiver education and instruction: Diagnosis, prognosis, self care, activity modification, brace/ splint application and exercises   [x]  Plan of care has been reviewed with BRITTNEY Abel, PT 12/30/2021 9:13 AM    ________________________________________________________________________    I certify that the above Therapy Services are being furnished while the patient is under my care. I agree with the treatment plan and certify that this therapy is necessary.     [id number] Signature:____________Date:_________TIME:________     STACEY Black  ** Signature, Date and Time must be completed for valid certification **    Please sign and return to [facility] [address] [address] [facility] [address] [place] [address], [address]  [phone] fax

## 2021-12-30 NOTE — PROGRESS NOTES
PT DAILY TREATMENT NOTE     Patient Name: Zulma Tijerina  Date:2021  : 2000  [x]  Patient  Verified  Payor: [employer] / Plan: [address] / Product Type: Managed Care Medicaid /    In time:745AM  Out time:828AM  Total Treatment Time (min): 43  Visit #:  of 24     Treatment Area: Left knee pain [M25.562]  Status post knee surgery [Z98.890]    SUBJECTIVE  Pain Level (0-10 scale): 8  Any medication changes, allergies to medications, adverse drug reactions, diagnosis change, or new procedure performed?: [x] No    [] Yes (see summary sheet for update)  Subjective functional status/changes:   [] No changes reported  See POC    OBJECTIVE    Modality rationale: decrease edema, decrease inflammation and decrease pain to improve the patients ability to manage self care. Min Type Additional Details   10 [x]  Vasopneumatic Device    []  Right     [x]  Left  Pre-treatment girth:  Post-treatment girth:  Measured at (location):  Pressure:       [x] lo [] med [] hi   Temperature: [x] lo [] med [] hi   [] Skin assessment post-treatment:  []intact []redness- no adverse reaction    []redness  adverse reaction:     9 min [x]Eval                  []Re-Eval       8 min Therapeutic Exercise:  [] See flow sheet : HEP   Rationale: increase ROM and increase strength to improve the patients ability to manage transfers and ADLs. 8 min Self Care/Home management:  []  See flow sheet : Pt education to ice after exercise and for swelling 3-5x/day for the first two weeks then decrease to 1-3x/day. Discussed PROM at home with roommate assist.    Rationale: increase ROM and reduce edema  to improve the patients ability to manage self care. 8 min Manual Therapy:  PROM with overpressure oscillations GR II into knee extension and flexion as tolerated. The manual therapy interventions were performed at a separate and distinct time from the therapeutic activities interventions.   Rationale: increase ROM and increase tissue extensibility to improve ease of ambulation and transfers. With   [] TE   [] TA   [] neuro   [] other: Patient Education: [x] Review HEP    [] Progressed/Changed HEP based on:   [] positioning   [] body mechanics   [] transfers   [] heat/ice application    [] other:      Other Objective/Functional Measures: see paper evaluation. Pain Level (0-10 scale) post treatment: 5    ASSESSMENT/Changes in Function: Pt is a 57-year-old woman presenting to the clinic s/p a left ACL reconstruction with semitendinosus graft and a medial menisectomy on 12/29/2021. Brace is unlocked per surgeon's orders. Fair quad set noted today but requires active assist by therapist for SLR. Nerve block remains in effect for the anterior upper leg, with pain today at the graft site. Swelling is present but unmeasured secondary to bandaging. Knee PROM measures 0-17-85 deg today with empty end feels. Signs and symptoms consistent with post-operative state. Pt will benefit from receipt of skilled PT services at a frequency of 3x/week for the next 8 weeks to improve impairments in strength, ROM, flexibility, joint mobility, gait mechanics, stair negotiation ease, and ease of functional transfers. Patient will continue to benefit from skilled PT services to modify and progress therapeutic interventions, address functional mobility deficits, address ROM deficits, address strength deficits, analyze and address soft tissue restrictions, analyze and cue movement patterns, analyze and modify body mechanics/ergonomics, assess and modify postural abnormalities, address imbalance/dizziness and instruct in home and community integration to attain remaining goals. [x]  See Plan of Care  []  See progress note/recertification  []  See Discharge Summary         Progress towards goals / Updated goals:  Short Term Goals: To be accomplished in 2 weeks:  1.  Pt will report daily compliance with HEP at least twice daily to maximize therapeutic success. Eval: HEP assigned  2. Pt will demonstrate SLR without assistance to improve independence with HEP. Eval: Morales required  Long Term Goals: To be accomplished in 8 weeks:  1. Pt will demonstrate 10 SLR without quad lag to improve knee stability for ambulation. Eval: 5 with Morales  2. Pt will improve her left knee strength to 4+/5 MMT to improve stability for stair negotiation and squatting. Eval: 2/5 quad, 2/5 HS secondary to pain  3. Pt will improve her left knee ROM to 0-120 deg to improve gait mechanics. Eval: 0-17-85 deg  4. Pt will ambulate without AD for at least 150 ft with minimal to no difficulty to improve independence with mobility. Eval: axillary crutches NWB x 1-2 weeks  5. Pt will improve her FOTO to 70, demonstrating improved functional ADL capacity.    Eval: 44      PLAN  []  Upgrade activities as tolerated     [x]  Continue plan of care  []  Update interventions per flow sheet       []  Discharge due to:_  []  Other:_      Dilshad Clifford, PT 12/30/2021  9:00 AM    Future Appointments   Date Time Provider Graciela Loyd   1/3/2022 12:45 PM Ten Stewart, PTA [facility]   1/5/2022 12:00 PM Starlene Paling, PTA [facility]   1/6/2022  8:45 AM Reid Pak, PA [facility]   1/7/2022 11:15 AM Elma Ortiz, PT [facility]   1/10/2022 10:45 AM Elma Ortiz, PT [facility]   1/12/2022 11:15 AM Ten Stewart, PTA [facility]   1/14/2022 10:00 AM Starlene Paling, PTA [facility]   1/17/2022 10:30 AM Starlene Paling, PTA [facility]   1/19/2022 11:15 AM Ten Stewart, PTA [facility]   1/21/2022 10:45 AM Starlene Paling, PTA [facility]   1/24/2022 10:30 AM Starlene Paling, PTA [facility]   1/26/2022 11:15 AM Elma Ortiz, PT [facility]   1/28/2022 10:45 AM Megan Saravia Setting, PTA [facility]

## 2022-01-03 ENCOUNTER — HOSPITAL ENCOUNTER (OUTPATIENT)
Dept: PHYSICAL THERAPY | Age: 22
Discharge: HOME | End: 2022-01-03
Attending: PHYSICIAN ASSISTANT
Payer: COMMERCIAL

## 2022-01-03 PROCEDURE — 97112 NEUROMUSCULAR REEDUCATION: CPT

## 2022-01-03 PROCEDURE — 97140 MANUAL THERAPY 1/> REGIONS: CPT

## 2022-01-03 PROCEDURE — 97530 THERAPEUTIC ACTIVITIES: CPT

## 2022-01-03 PROCEDURE — 97110 THERAPEUTIC EXERCISES: CPT

## 2022-01-03 NOTE — PROGRESS NOTES
PT DAILY TREATMENT NOTE     Patient Name: Mateo Maldonado  Date:1/3/2022  : 2000  [x]  Patient  Verified  Payor: Madd[employer] / Plan: [address] / Product Type: Managed Care Medicaid /    In time:12:45  Out time: 1:32  Total Treatment Time (min): 52  Visit #:     Treatment Area: Left knee pain [M25.562]  Status post knee surgery [Z98.890]    SUBJECTIVE  Pain Level (0-10 scale): 6  Any medication changes, allergies to medications, adverse drug reactions, diagnosis change, or new procedure performed?: [x] No    [] Yes (see summary sheet for update)  Subjective functional status/changes:   [] No changes reported  Pt reports her knee is feeling better but she is still having some numbness in the front of her leg from the nerve block. OBJECTIVE      22 min Therapeutic Exercise:  [x] See flow sheet :   Rationale: increase ROM and increase strength to improve the patients ability to perform functional task with ease. 8 min Therapeutic Activity:  [x]  See flow sheet :   Rationale: increase strength and improve coordination  to improve the patients ability to tolerate daily activities with ease. 9 min Neuromuscular Re-education:  [x]  See flow sheet :   Rationale: increase strength, improve coordination, improve balance and increase proprioception  to improve the patients ability to perform ADL's with ease. 8 min Manual Therapy:   PROM with overpressure oscillations GR II into knee extension and flexion as tolerated. The manual therapy interventions were performed at a separate and distinct time from the therapeutic activities interventions. Rationale: decrease pain, increase ROM and increase tissue extensibility to improve functional mobility with ambulation ADL's.             With   [] TE   [] TA   [] neuro   [] other: Patient Education: [x] Review HEP    [] Progressed/Changed HEP based on:   [] positioning   [] body mechanics   [] transfers   [] heat/ice application    [] other:      Other Objective/Functional Measures: initiated therex per flowsheet     Pain Level (0-10 scale) post treatment: 4-5    ASSESSMENT/Changes in Function:   First f/u session with pt presenting with B crutches and displaying a good tolerance to therex as well as putting forth a good effort with exercise. Pt reports HEP compliance and understanding noting no increasing pain with home exercises. Quad lag and limitations with TKE this visit with pt noting left knee ext causing more pain than flexion. Pt reports no increasing pain with therex and notes decreased stiffness and pain post treatment. Patient will continue to benefit from skilled PT services to modify and progress therapeutic interventions, address functional mobility deficits, address ROM deficits, address strength deficits, analyze and address soft tissue restrictions, analyze and cue movement patterns, analyze and modify body mechanics/ergonomics and assess and modify postural abnormalities to attain remaining goals. [x]  See Plan of Care  []  See progress note/recertification  []  See Discharge Summary         Progress towards goals / Updated goals:  Short Term Goals: To be accomplished in 2 weeks:  1. Pt will report daily compliance with HEP at least twice daily to maximize therapeutic success. Eval: HEP assigned  Current: met 1/3/22 HEP understanding and compliance   2. Pt will demonstrate SLR without assistance to improve independence with HEP. Eval: Morales required  Long Term Goals: To be accomplished in 8 weeks:  1. Pt will demonstrate 10 SLR without quad lag to improve knee stability for ambulation. Eval: 5 with Morales  2. Pt will improve her left knee strength to 4+/5 MMT to improve stability for stair negotiation and squatting. Eval: 2/5 quad, 2/5 HS secondary to pain  3. Pt will improve her left knee ROM to 0-120 deg to improve gait mechanics.               Eval: 0-17-85 deg  4. Pt will ambulate without AD for at least 150 ft with minimal to no difficulty to improve independence with mobility. Eval: axillary crutches NWB x 1-2 weeks  5. Pt will improve her FOTO to 70, demonstrating improved functional ADL capacity.               Eval: 44    PLAN  []  Upgrade activities as tolerated     [x]  Continue plan of care  []  Update interventions per flow sheet       []  Discharge due to:_  []  Other:_      Rebekah Isaac, PTA 1/3/2022  12:40 PM    Future Appointments   Date Time Provider Graciela Loyd   1/3/2022 12:45 PM Lei Burkett, PTA [facility]   1/5/2022 12:00 PM Humera Marcelino, PTA [facility]   1/6/2022  8:45 AM STACEY Palma [facility]   1/7/2022 11:15 AM Dariela Appl, PT [facility]   1/10/2022 10:45 AM Dariela Appl, PT [facility]   1/12/2022 11:15 AM Lei Burkett, PTA [facility]   1/14/2022 10:00 AM Humera Marcelino, PTA [facility]   1/17/2022 10:30 AM Humera Marcelino, PTA [facility]   1/19/2022 11:15 AM Lei Burktet, PTA [facility]   1/21/2022 10:45 AM Humera Marcelino, PTA [facility]   1/24/2022 10:30 AM Humera Marcelino, PTA [facility]   1/26/2022 11:15 AM Dariela Appl, PT [facility]   1/28/2022 10:45 AM Johnny Saravia, PTA [facility]

## 2022-01-05 ENCOUNTER — HOSPITAL ENCOUNTER (OUTPATIENT)
Dept: PHYSICAL THERAPY | Age: 22
Discharge: HOME | End: 2022-01-05
Attending: PHYSICIAN ASSISTANT
Payer: COMMERCIAL

## 2022-01-05 PROCEDURE — 97110 THERAPEUTIC EXERCISES: CPT

## 2022-01-05 PROCEDURE — 97112 NEUROMUSCULAR REEDUCATION: CPT

## 2022-01-05 PROCEDURE — 97014 ELECTRIC STIMULATION THERAPY: CPT

## 2022-01-05 NOTE — PROGRESS NOTES
PT DAILY TREATMENT NOTE     Patient Name: Rosalinda Felix  Date:2022  : 2000  [x]  Patient  Verified  Payor: Maddie [employer] / Plan: [address] / Product Type: Managed Care Medicaid /    In time:12:00  Out time:1:00  Total Treatment Time (min): 60  Visit #: 2 of 24    Treatment Area: Left knee pain [M25.562]  Status post knee surgery [Z98.890]    SUBJECTIVE  Pain Level (0-10 scale): 6/10  Any medication changes, allergies to medications, adverse drug reactions, diagnosis change, or new procedure performed?: [x] No    [] Yes (see summary sheet for update)  Subjective functional status/changes:   [] No changes reported  \"It feels stiff, like it doesn't want to fully straighten or bend. \"    OBJECTIVE    Modality rationale: decrease pain and increase muscle contraction/control to improve the patients ability to perform ADL's.    Min Type Additional Details   10 [x] Estim:  [x]Unatt       []IFC  []Premod                        [x]Other: [place] E-stim 10\":20\" []w/ice   []w/heat  Position: Reclined  Location: Left quads    [] Estim: []Att    []TENS instruct  []NMES                    []Other:  []w/US   []w/ice   []w/heat  Position:  Location:    []  Traction: [] Cervical       []Lumbar                       [] Prone          []Supine                       []Intermittent   []Continuous Lbs:  [] before manual  [] after manual    []  Ultrasound: []Continuous   [] Pulsed                           []1MHz   []3MHz W/cm2:  Location:    []  Iontophoresis with dexamethasone         Location: [] Take home patch   [] In clinic    []  Ice     []  heat  []  Ice massage  []  Laser   []  Anodyne Position:  Location:    []  Laser with stim  []  Other:  Position:  Location:    []  Vasopneumatic Device    []  Right     []  Left  Pre-treatment girth:  Post-treatment girth:  Measured at (location):  Pressure:       [] lo [] med [] hi   Temperature: [] lo [] med [] hi   [] Skin assessment post-treatment:  []intact []redness- no adverse reaction    []redness  adverse reaction:     34 min Therapeutic Exercise:  [x] See flow sheet :   Rationale: increase ROM and increase strength to improve the patients ability to prepare for ambulation without AD. 8 min Neuromuscular Re-education:  [x]  See flow sheet : Quad set, SLR with quad set   Rationale: increase strength, improve coordination and increase proprioception  to improve the patients ability to ambulate without AD once cleared. 8 min Manual Therapy:  Gentle Left patellar mobs, STM quads. PROM per protocol. Pt supine. The manual therapy interventions were performed at a separate and distinct time from the therapeutic activities interventions. Rationale: decrease pain, increase ROM, increase tissue extensibility and decrease trigger points to improve ease of performing ADL's. With   [x] TE   [] TA   [] neuro   [] other: Patient Education: [x] Review HEP    [] Progressed/Changed HEP based on:   [] positioning   [] body mechanics   [] transfers   [] heat/ice application    [] other:      Other Objective/Functional Measures: Min-Mod(A) with SLR flexion to support heel. Added [place] E-stim to improve Left quad contraction, PD ice post-treatment. Pain Level (0-10 scale) post treatment: 6/10    ASSESSMENT/Changes in Function: Pt fully participated in treatment. Reviewed precautions, pt verbally reports understanding. Continue PT to increase Left LE strength and Left knee ROM to prepare for ambulation without AD. Patient will continue to benefit from skilled PT services to modify and progress therapeutic interventions, address functional mobility deficits, address ROM deficits, address strength deficits, analyze and address soft tissue restrictions, analyze and cue movement patterns and analyze and modify body mechanics/ergonomics to attain remaining goals.      [x]  See Plan of Care  []  See progress note/recertification  []  See Discharge Summary          Progress towards goals / Updated goals:  Short Term Goals: To be accomplished in 2 weeks:  1. Pt will report daily compliance with HEP at least twice daily to maximize therapeutic success.              Eval: HEP assigned  Current: met 1/3/22 HEP understanding and compliance   2. Pt will demonstrate SLR without assistance to improve independence with HEP.             Eval: Morales required  Long Term Goals: To be accomplished in 8 weeks:  1. Pt will demonstrate 10 SLR without quad lag to improve knee stability for ambulation.              Eval: 5 with Morales  2. Pt will improve her left knee strength to 4+/5 MMT to improve stability for stair negotiation and squatting.              Eval: 2/5 quad, 2/5 HS secondary to pain  3. Pt will improve her left knee ROM to 0-120 deg to improve gait mechanics.             Eval: 0-17-85 deg  4. Pt will ambulate without AD for at least 150 ft with minimal to no difficulty to improve independence with mobility.             Eval: axillary crutches NWB x 1-2 weeks  5. Pt will improve her FOTO to 70, demonstrating improved functional ADL capacity.               Eval: 40    PLAN  []  Upgrade activities as tolerated     [x]  Continue plan of care  []  Update interventions per flow sheet       []  Discharge due to:_  []  Other:_      Shawn Dorman, BRITTNEY 1/5/2022  11:52 AM    Future Appointments   Date Time Provider Graciela Loyd   1/5/2022 12:00 PM Souleymane Lay, PTA [facility]   1/6/2022  8:45 AM STACEY Benitez [facility]   1/7/2022 11:15 AM Vel Gauthier, PT [facility]   1/10/2022 10:45 AM Vel Gauthier, PT [facility]   1/12/2022 11:15 AM Jaja Patrick, PTA [facility]   1/14/2022 10:00 AM Souleymane Lay, PTA [facility]   1/17/2022 10:30 AM Souleymane Lay, PTA [facility]   1/19/2022 11:15 AM Jaja Patrick, PTA [facility]   1/21/2022 10:45 AM Souleymane Lay, PTA [facility]   1/24/2022 10:30 [facility]   1/26/2022 11:15 AM Lindsay Samayoa PT [facility]   1/28/2022 10:45 AM [facility]

## 2022-01-07 ENCOUNTER — HOSPITAL ENCOUNTER (OUTPATIENT)
Dept: PHYSICAL THERAPY | Age: 22
Discharge: HOME | End: 2022-01-07
Attending: PHYSICIAN ASSISTANT
Payer: COMMERCIAL

## 2022-01-07 PROCEDURE — 97112 NEUROMUSCULAR REEDUCATION: CPT

## 2022-01-07 PROCEDURE — 97110 THERAPEUTIC EXERCISES: CPT

## 2022-01-07 NOTE — PROGRESS NOTES
PT DAILY TREATMENT NOTE     Patient Name: Wally Bradley  Date:2022  : 2000  [x]  Patient  Verified  Payor: [address] [employer] / Plan: [address] / Product Type: Managed Care Medicaid /    In time:1115am  Out time:1208pm  Total Treatment Time (min): 48  Visit #: 4 of 24     Treatment Area: Left knee pain [M25.562]  Status post knee surgery [Z98.890]    SUBJECTIVE  Pain Level (0-10 scale): 4  Any medication changes, allergies to medications, adverse drug reactions, diagnosis change, or new procedure performed?: [x] No    [] Yes (see summary sheet for update)  Subjective functional status/changes:   [x] No changes reported    OBJECTIVE    Modality rationale: decrease edema, decrease inflammation and decrease pain to improve the patients ability to manage ADLs. Min Type Additional Details   10 [x]  Vasopneumatic Device    []  Right     [x]  Left  Pre-treatment girth:  Post-treatment girth:  Measured at (location):  Pressure:       [x] lo [] med [] hi   Temperature: [x] lo [] med [] hi   [] Skin assessment post-treatment:  []intact []redness- no adverse reaction    []redness  adverse reaction:     10 min Therapeutic Exercise:  [] See flow sheet :   Rationale: increase ROM and increase strength to improve the patients ability to prepare for ambulation without AD. 15 min Neuromuscular Re-education:  []  See flow sheet : quad set, SAQ, SLR x 4 plinth, SLR x 3 standing with emphasis on quad contract   Rationale: increase strength, improve coordination and increase proprioception  to improve the patients ability to ambulate without AD once cleared. 12 min Manual Therapy:  Gentle STM to the HS, superior patellar oscillations in knee extension. GR II knee extension mobs. In available knee flexion, performed GR III inferior patellar oscillations.    The manual therapy interventions were performed at a separate and distinct time from the therapeutic activities interventions. Rationale: decrease pain, increase ROM, increase tissue extensibility and decrease trigger points to improve ease of performing ADL's. With   [] TE   [] TA   [] neuro   [] other: Patient Education: [x] Review HEP    [] Progressed/Changed HEP based on:   [] positioning   [] body mechanics   [] transfers   [] heat/ice application    [] other:      Other Objective/Functional Measures: exercises per flowsheet     Pain Level (0-10 scale) post treatment: 5    ASSESSMENT/Changes in Function: Pt performs all exercises as directed with minimal to no pain. Emphasized patellar oscillations in knee flexion today due to c/o increased discomfort with heel slides. Tenderness at lateral quad and ITB today as well as patellar tendon. Patient will continue to benefit from skilled PT services to modify and progress therapeutic interventions, address functional mobility deficits, address ROM deficits, address strength deficits, analyze and address soft tissue restrictions, analyze and cue movement patterns, analyze and modify body mechanics/ergonomics, assess and modify postural abnormalities, address imbalance/dizziness and instruct in home and community integration to attain remaining goals. []  See Plan of Care  []  See progress note/recertification  []  See Discharge Summary         Progress towards goals / Updated goals:  Short Term Goals: To be accomplished in 2 weeks:  1. Pt will report daily compliance with HEP at least twice daily to maximize therapeutic success.              Eval: HEP assigned  Current: met 1/3/22 HEP understanding and compliance   2. Pt will demonstrate SLR without assistance to improve independence with HEP.             Odalis Dawn required   Current: met, independent but quad lag (1/7/2021)  Long Term Goals: To be accomplished in 8 weeks:  1. Pt will demonstrate 10 SLR without quad lag to improve knee stability for ambulation.              Eval: 5 with Morales  2.  Pt will improve her left knee strength to 4+/5 MMT to improve stability for stair negotiation and squatting.              Eval: 2/5 quad, 2/5 HS secondary to pain  3. Pt will improve her left knee ROM to 0-120 deg to improve gait mechanics.             Eval: 0-17-85 deg  4. Pt will ambulate without AD for at least 150 ft with minimal to no difficulty to improve independence with mobility.             Eval: axillary crutches NWB x 1-2 weeks  5. Pt will improve her FOTO to 70, demonstrating improved functional ADL capacity.               Eval: 40    PLAN  []  Upgrade activities as tolerated     []  Continue plan of care  []  Update interventions per flow sheet       []  Discharge due to:_  []  Other:_      Betsey Moore, PT 1/7/2022  11:32 AM    Future Appointments   Date Time Provider Graciela Loyd   1/10/2022 10:45 AM Horacio Calvo, PT [facility]   1/12/2022 11:15 AM Salvador Nicholas, PTA [facility]   1/14/2022 10:00 AM Dorcus Milan, PTA [facility]   1/17/2022 10:30 AM Dorcus Milan, PTA [facility]   1/19/2022 11:15 AM Salvador Nicholas, PTA [facility]   1/21/2022 10:45 AM Doramira Milan, PTA [facility]   1/24/2022 10:30 AM Dorcus Milan, PTA [facility]   1/26/2022 11:15 AM Horacio Calvo, PT [facility]   1/28/2022 10:45 AM Elida Saravia, PTA [facility]

## 2022-01-10 ENCOUNTER — HOSPITAL ENCOUNTER (OUTPATIENT)
Dept: PHYSICAL THERAPY | Age: 22
Discharge: HOME | End: 2022-01-10
Attending: PHYSICIAN ASSISTANT
Payer: COMMERCIAL

## 2022-01-10 PROCEDURE — 97014 ELECTRIC STIMULATION THERAPY: CPT

## 2022-01-10 PROCEDURE — 97112 NEUROMUSCULAR REEDUCATION: CPT

## 2022-01-10 PROCEDURE — 97110 THERAPEUTIC EXERCISES: CPT

## 2022-01-10 NOTE — PROGRESS NOTES
PT DAILY TREATMENT NOTE     Patient Name: March Benito  Date:1/10/2022  : 2000  [x]  Patient  Verified  Payor: Maddie Sanchez / Plan: [address] / Product Type: Managed Care Medicaid /    In time:1045am  Out time:1135am  Total Treatment Time (min): 50  Visit #: 5 of 24     Treatment Area: Left knee pain [M25.562]  Status post knee surgery [Z98.890]    SUBJECTIVE  Pain Level (0-10 scale): 4  Any medication changes, allergies to medications, adverse drug reactions, diagnosis change, or new procedure performed?: [x] No    [] Yes (see summary sheet for update)  Subjective functional status/changes:   [] No changes reported  Pt reports she feels some better control of the quads. Reports continued burning discomfort in the lower anterior leg that she expresses may be continued from the nerve block in her hip flexor. OBJECTIVE    Modality rationale: increase muscle contraction/control to improve the patients ability to ambulate with improved ease. Min Type Additional Details   10 [x] Estim:  []Unatt       []IFC  []Premod                        [x]Other: [place]; 10/10 on/off, 2 sec ramp, 50 bps, 36 mA []w/ice   []w/heat  Position: reclined long sit  Location: quads; 2 channel concurrent   [] Skin assessment post-treatment:  []intact []redness- no adverse reaction    []redness  adverse reaction:     17 min Therapeutic Exercise:  [x] See flow sheet :   Rationale: increase ROM and increase strength to improve the patients ability to prepare for ambulation without AD. 15 min Neuromuscular Re-education:  [x]  See flow sheet : quad set, SAQ, SLR x 4 plinth, SLR x 3 standing with emphasis on quad contract, weight shifting   Rationale: increase strength, improve coordination and increase proprioception  to improve the patients ability to ambulate without AD once cleared. 8 min Manual Therapy:  STM medial quad, ITB, lateral HS.  Superior patellar oscillations in supine followe dby GR II/III knee extension mobs. In knee flexion, performed inferior GR III patellar oscillations followed by posterior tib-fem oscillations GR II   The manual therapy interventions were performed at a separate and distinct time from the therapeutic activities interventions. Rationale: decrease pain, increase ROM, increase tissue extensibility and decrease trigger points to improve ease of performing ADL's. With   [] TE   [] TA   [] neuro   [] other: Patient Education: [x] Review HEP    [] Progressed/Changed HEP based on:   [] positioning   [] body mechanics   [] transfers   [] heat/ice application    [] other:      Other Objective/Functional Measures: added standing HS curls and weight shifting     Pain Level (0-10 scale) post treatment: 4    ASSESSMENT/Changes in Function: Pt performs all exercises as directed with minimal to no pain. Emphasized no pain with brief introduction to PWB. Estim was initially uncomfortable with occasional zap when starting the machine but this very quickly went away and was well tolerated by the pt. Patient will continue to benefit from skilled PT services to modify and progress therapeutic interventions, address functional mobility deficits, address ROM deficits, address strength deficits, analyze and address soft tissue restrictions, analyze and cue movement patterns, analyze and modify body mechanics/ergonomics, assess and modify postural abnormalities, address imbalance/dizziness and instruct in home and community integration to attain remaining goals. []  See Plan of Care  []  See progress note/recertification  []  See Discharge Summary         Progress towards goals / Updated goals:  Short Term Goals: To be accomplished in 2 weeks:  1. Pt will report daily compliance with HEP at least twice daily to maximize therapeutic success.              Eval: HEP assigned  Current: met 1/3/22 HEP understanding and compliance   2.  Pt will demonstrate SLR without assistance to improve independence with HEP.             Ninakelsey Segovias required              Current: met, independent but quad lag (1/7/2021)  Long Term Goals: To be accomplished in 8 weeks:  1. Pt will demonstrate 10 SLR without quad lag to improve knee stability for ambulation.              Eval: 5 with Morales   2. Pt will improve her left knee strength to 4+/5 MMT to improve stability for stair negotiation and squatting.              Eval: 2/5 quad, 2/5 HS secondary to pain  3. Pt will improve her left knee ROM to 0-120 deg to improve gait mechanics.             Eval: 0-17-85 deg   Current: 0- deg (1/10/2022)  4. Pt will ambulate without AD for at least 150 ft with minimal to no difficulty to improve independence with mobility.             Eval: axillary crutches NWB x 1-2 weeks  5. Pt will improve her FOTO to 70, demonstrating improved functional ADL capacity.               Eval: 40    PLAN  []  Upgrade activities as tolerated     [x]  Continue plan of care   []  Update interventions per flow sheet       []  Discharge due to:_  []  Other:_      Reggie Aguilar, PT 1/10/2022  11:05 AM    Future Appointments   Date Time Provider Graciela Loyd   1/12/2022 11:15 AM Lianna Rodrigues, PTA [facility]   1/14/2022 10:00 AM Teresa Sophia, PTA [facility]   1/17/2022 10:30 AM Teresa Sophia, PTA [facility]   1/19/2022 11:15 AM Lianna Rodrigues, PTA [facility]   1/21/2022 10:45 AM Teresa Sophia, PTA [facility]   1/24/2022 10:30 AM Teresa Sophia, PTA [facility]   1/26/2022 11:15 AM Jerrald Breath, PT [facility]   1/28/2022 10:45 AM [place], Gaither Blizzard, PTA [facility]

## 2022-01-12 ENCOUNTER — HOSPITAL ENCOUNTER (OUTPATIENT)
Dept: PHYSICAL THERAPY | Age: 22
Discharge: HOME | End: 2022-01-12
Attending: PHYSICIAN ASSISTANT
Payer: COMMERCIAL

## 2022-01-12 PROCEDURE — 97530 THERAPEUTIC ACTIVITIES: CPT

## 2022-01-12 PROCEDURE — 97110 THERAPEUTIC EXERCISES: CPT

## 2022-01-12 PROCEDURE — 97112 NEUROMUSCULAR REEDUCATION: CPT

## 2022-01-12 NOTE — PROGRESS NOTES
PT DAILY TREATMENT NOTE     Patient Name: Carl Fitzgerald  Date:2022  : 2000  [x]  Patient  Verified  Payor: Maddie Sanchez / Plan: [address] / Product Type: Managed Care Medicaid /    In time: 11:15  Out time:12:12  Total Treatment Time (min): 62  Visit #: 6 of 24     Treatment Area: Left knee pain [M25.562]  Status post knee surgery [Z98.890]    SUBJECTIVE  Pain Level (0-10 scale): 2-3  Any medication changes, allergies to medications, adverse drug reactions, diagnosis change, or new procedure performed?: [x] No    [] Yes (see summary sheet for update)  Subjective functional status/changes:   [] No changes reported  Pt reports her MD told her she can start to ease into putting weight onto her left knee after two weeks. OBJECTIVE    Modality rationale: decrease edema, decrease inflammation and decrease pain to improve the patients ability to tolerate post workout soreness.    Min Type Additional Details    [] Estim:  []Unatt       []IFC  []Premod                        []Other:  []w/ice   []w/heat  Position:  Location:    [] Estim: []Att    []TENS instruct  []NMES                    []Other:  []w/US   []w/ice   []w/heat  Position:  Location:    []  Traction: [] Cervical       []Lumbar                       [] Prone          []Supine                       []Intermittent   []Continuous Lbs:  [] before manual  [] after manual    []  Ultrasound: []Continuous   [] Pulsed                           []1MHz   []3MHz W/cm2:  Location:    []  Iontophoresis with dexamethasone         Location: [] Take home patch   [] In clinic   10 [x]  Ice     []  heat  []  Ice massage  []  Laser   []  Anodyne Position:reclined  Location:left knee    []  Laser with stim  []  Other:  Position:  Location:    []  Vasopneumatic Device    []  Right     []  Left  Pre-treatment girth:  Post-treatment girth:  Measured at (location):  Pressure:       [] lo [] med [] hi   Temperature: [] lo [] med [] hi [] Skin assessment post-treatment:  []intact []redness- no adverse reaction    []redness  adverse reaction:       16 min Therapeutic Exercise:  [x] See flow sheet :   Rationale: increase ROM and increase strength to improve the patients ability to perform functional task with ease. 8 min Therapeutic Activity:  [x]  See flow sheet : weight shifting,    Rationale: increase strength and improve coordination  to improve the patients ability to perform ADL's with ease. 15 min Neuromuscular Re-education:  [x]  See flow sheet :   Rationale: increase strength, improve coordination, improve balance and increase proprioception  to improve the patients ability to perform ADL's with ease. 8 min Manual Therapy:  STM medial quad, ITB, lateral HS. Superior patellar oscillations in supine followe dby GR II/III knee extension mobs. In knee flexion, performed inferior GR III patellar oscillations followed by posterior tib-fem oscillations GR II   The manual therapy interventions were performed at a separate and distinct time from the therapeutic activities interventions. Rationale: decrease pain, increase ROM and increase tissue extensibility to improve functional mobility with ADL's            With   [] TE   [] TA   [] neuro   [] other: Patient Education: [x] Review HEP    [] Progressed/Changed HEP based on:   [] positioning   [] body mechanics   [] transfers   [] heat/ice application    [] other:      Other Objective/Functional Measures: NMES held this visit due to pt wearing pants. Prone knee ext- 10x 3\"     Pain Level (0-10 scale) post treatment: 4    ASSESSMENT/Changes in Function:   Pt displays improving left knee flexion mobility and ROM noting some end range pain but improved since SOC. Pt does display some continued limitations with TKE and displays tightness in the ITB and lateral quad.  Pt notes increased ease with weight shifting today and reports her MD stated that after 2 weeks that she could start to increase her WB'ing tolerance. Two weeks begins tomorrow (1/13/22). Pt reports a minor increase in soreness post treatment but left in no apparent distress. Patient will continue to benefit from skilled PT services to modify and progress therapeutic interventions, address functional mobility deficits, address ROM deficits, address strength deficits, analyze and address soft tissue restrictions, analyze and cue movement patterns, analyze and modify body mechanics/ergonomics and assess and modify postural abnormalities to attain remaining goals. [x]  See Plan of Care  []  See progress note/recertification  []  See Discharge Summary         Progress towards goals / Updated goals:  Short Term Goals: To be accomplished in 2 weeks:  1. Pt will report daily compliance with HEP at least twice daily to maximize therapeutic success.              Eval: HEP assigned  Current: met 1/3/22 HEP understanding and compliance   2. Pt will demonstrate SLR without assistance to improve independence with HEP.             EvalViraj Mcgarry required              PMDOYSR: met, independent but quad lag (1/7/2021)  Long Term Goals: To be accomplished in 8 weeks:  1. Pt will demonstrate 10 SLR without quad lag to improve knee stability for ambulation.              Eval: 5 with Morales   2. Pt will improve her left knee strength to 4+/5 MMT to improve stability for stair negotiation and squatting.              Eval: 2/5 quad, 2/5 HS secondary to pain  3. Pt will improve her left knee ROM to 0-120 deg to improve gait mechanics.             Eval: 0-17-85 deg              Current: 0- deg (1/10/2022)  4. Pt will ambulate without AD for at least 150 ft with minimal to no difficulty to improve independence with mobility.             Eval: axillary crutches NWB x 1-2 weeks  5. Pt will improve her FOTO to 70, demonstrating improved functional ADL capacity.               Eval: 40    PLAN  []  Upgrade activities as tolerated     [x]  Continue plan of care  []  Update interventions per flow sheet       []  Discharge due to:_  []  Other:_      Fabian Busby, PTA 1/12/2022  11:22 AM    Future Appointments   Date Time Provider Graciela Loyd   1/14/2022 10:00 AM Buzzy Javi, PTA [facility]   1/17/2022 10:30 AM Buzzy Javi, PTA [facility]   1/19/2022 11:15 AM Dalia Curet, PTA [facility]   1/21/2022 10:45 AM Buzzy Javi, PTA [facility]   1/24/2022 10:30 AM Buzzy Javi, PTA [facility]   1/26/2022 11:15 AM Karena Sifuentes, PT [facility]   1/28/2022 10:45 AM Bisi Saravia, PTA [facility]

## 2022-01-14 ENCOUNTER — HOSPITAL ENCOUNTER (OUTPATIENT)
Dept: PHYSICAL THERAPY | Age: 22
Discharge: HOME | End: 2022-01-14
Attending: PHYSICIAN ASSISTANT
Payer: COMMERCIAL

## 2022-01-14 ENCOUNTER — TELEPHONE (OUTPATIENT)
Dept: ORTHOPEDIC SURGERY | Age: 22
End: 2022-01-14

## 2022-01-14 PROCEDURE — 97110 THERAPEUTIC EXERCISES: CPT

## 2022-01-14 PROCEDURE — 97112 NEUROMUSCULAR REEDUCATION: CPT

## 2022-01-14 NOTE — PROGRESS NOTES
PT DAILY TREATMENT NOTE     Patient Name: Mateo Maldonado  Date:2022  : 2000  [x]  Patient  Verified  Payor: Maddie 22 / Plan: [address] / Product Type: Managed Care Medicaid /    In time:9:57  Out time:10:52  Total Treatment Time (min): 55  Visit #: 7 of 24    Treatment Area: Left knee pain [M25.562]  Status post knee surgery [Z98.890]    SUBJECTIVE  Pain Level (0-10 scale): 2/10  Any medication changes, allergies to medications, adverse drug reactions, diagnosis change, or new procedure performed?: [x] No    [] Yes (see summary sheet for update)  Subjective functional status/changes:   [] No changes reported  \"Really tired, didn't get much sleep last night. \"    OBJECTIVE    Modality rationale: decrease inflammation and decrease pain to improve the patients ability to perform ADL's.    Min Type Additional Details    [] Estim:  []Unatt       []IFC  []Premod                        []Other:  []w/ice   []w/heat  Position:  Location:    [] Estim: []Att    []TENS instruct  []NMES                    []Other:  []w/US   []w/ice   []w/heat  Position:  Location:    []  Traction: [] Cervical       []Lumbar                       [] Prone          []Supine                       []Intermittent   []Continuous Lbs:  [] before manual  [] after manual    []  Ultrasound: []Continuous   [] Pulsed                           []1MHz   []3MHz W/cm2:  Location:    []  Iontophoresis with dexamethasone         Location: [] Take home patch   [] In clinic    []  Ice     []  heat  []  Ice massage  []  Laser   []  Anodyne Position:  Location:    []  Laser with stim  []  Other:  Position:  Location:   10 [x]  Vasopneumatic Device    []  Right     [x]  Left  Pre-treatment girth: 34.25cm  Post-treatment girth: 34cm  Measured at (location):  Mid-patella Pressure:       [x] lo [] med [] hi   Temperature: [x] lo [] med [] hi   [] Skin assessment post-treatment:  []intact []redness- no adverse reaction []redness  adverse reaction:     27 min Therapeutic Exercise:  [x] See flow sheet :   Rationale: increase ROM and increase strength to improve the patients ability to perform ADL's.    8 min Neuromuscular Re-education:  [x]  See flow sheet : Weight shifts, quad set. Rationale: increase strength, improve coordination, improve balance and increase proprioception  to improve the patients ability to ambulate without AD. 10 min Manual Therapy:  Left patellar mobs, STM quads, gastroc. Knee PROM end range flex/ext. Pt supine. The manual therapy interventions were performed at a separate and distinct time from the therapeutic activities interventions. Rationale: decrease pain, increase ROM, increase tissue extensibility and decrease trigger points to normalize gait pattern. With   [x] TE   [] TA   [] neuro   [] other: Patient Education: [x] Review HEP    [] Progressed/Changed HEP based on:   [] positioning   [] body mechanics   [] transfers   [] heat/ice application    [] other:      Other Objective/Functional Measures: Lacking TKE however ROM is improving. Pain Level (0-10 scale) post treatment: 2/10    ASSESSMENT/Changes in Function: Pt is just over 2 weeks post-op. Ambulating with (B) axillary crutches and Left knee brace (unlocked). Pt fully participates in treatment and demonstrates improved Left quad contraction. Continue PT per protocol to increase Left knee mobility and Left LE strength to prepare for ambulation with decreased levels of AD support. Patient will continue to benefit from skilled PT services to modify and progress therapeutic interventions, address functional mobility deficits, address ROM deficits, address strength deficits, analyze and address soft tissue restrictions, analyze and cue movement patterns and analyze and modify body mechanics/ergonomics to attain remaining goals.      [x]  See Plan of Care  []  See progress note/recertification  []  See Discharge Summary Progress towards goals / Updated goals:  Short Term Goals: To be accomplished in 2 weeks:  1. Pt will report daily compliance with HEP at least twice daily to maximize therapeutic success.              Eval: HEP assigned  Current: met 1/3/22 HEP understanding and compliance   2. Pt will demonstrate SLR without assistance to improve independence with HEP.             Ramon Mendoza required               RZCQDQD: met, independent but quad lag (1/7/2021)  Long Term Goals: To be accomplished in 8 weeks:  1. Pt will demonstrate 10 SLR without quad lag to improve knee stability for ambulation.              Eval: 5 with Morales   Current: Extension lag present. 1/14/2022   2. Pt will improve her left knee strength to 4+/5 MMT to improve stability for stair negotiation and squatting.              Eval: 2/5 quad, 2/5 HS secondary to pain  3. Pt will improve her left knee ROM to 0-120 deg to improve gait mechanics.             Eval: 0-17-85 deg              Current: 0- deg (1/10/2022)  4. Pt will ambulate without AD for at least 150 ft with minimal to no difficulty to improve independence with mobility.             Eval: axillary crutches NWB x 1-2 weeks  5. Pt will improve her FOTO to 70, demonstrating improved functional ADL capacity.               Eval: 40    PLAN  []  Upgrade activities as tolerated     [x]  Continue plan of care  []  Update interventions per flow sheet       []  Discharge due to:_  []  Other:_      Roque Anderson, PTA 1/14/2022  9:58 AM    Future Appointments   Date Time Provider Graciela Loyd   1/14/2022 10:00 AM Buzzy Javi, PTA [facility]   1/17/2022 10:30 AM Buzzy Javi, PTA [facility]   1/19/2022 11:15 AM Dalia Curet, PTA [facility]   1/21/2022 10:45 AM Buzzy Javi, PTA [facility]   1/24/2022 10:30 AM Buzzy Javi, PTA [facility]   1/26/2022 11:15 AM Karena Sifuentes, PT [facility]   1/28/2022 10:45 AM Treva Rose, PTA [facility]

## 2022-01-14 NOTE — LETTER
NOTIFICATION RETURN TO WORK / SCHOOL    1/17/2022 9:01 AM    Ms. [address]  [address] [facility],  [address]      To Whom It May Concern:    Zulma Tijerina is currently under the care of [address] [place] [address] Harsha Alba. When she returns to work, she will be on light duty for at least 4 weeks. She will be re evaluated on 1/20/22. If there are questions or concerns please have the patient contact our office.         Sincerely,      John Freeman MD

## 2022-01-17 ENCOUNTER — HOSPITAL ENCOUNTER (OUTPATIENT)
Dept: PHYSICAL THERAPY | Age: 22
Discharge: HOME | End: 2022-01-17
Attending: PHYSICIAN ASSISTANT
Payer: COMMERCIAL

## 2022-01-17 PROCEDURE — 97112 NEUROMUSCULAR REEDUCATION: CPT

## 2022-01-17 PROCEDURE — 97110 THERAPEUTIC EXERCISES: CPT

## 2022-01-17 NOTE — PROGRESS NOTES
PT DAILY TREATMENT NOTE     Patient Name: Princess Oliver  Date:2022  : 2000  [x]  Patient  Verified  Payor: Maddie Sanchez / Plan: [address] / Product Type: Managed Care Medicaid /    In time:10:20  Out time:11:11  Total Treatment Time (min): 51  Visit #: 8 of 24    Treatment Area: Left knee pain [M25.562]  Status post knee surgery [Z98.890]    SUBJECTIVE  Pain Level (0-10 scale): 2/10   Any medication changes, allergies to medications, adverse drug reactions, diagnosis change, or new procedure performed?: [x] No    [] Yes (see summary sheet for update)  Subjective functional status/changes:   [] No changes reported  \"Feeling a lot better. Walking with the one crutch now. \"    OBJECTIVE    33 min Therapeutic Exercise:  [x] See flow sheet :   Rationale: increase ROM, increase strength and increase proprioception to improve the patients ability to perform ADL's.    8 min Neuromuscular Re-education:  [x]  See flow sheet : Weight shifts, quad set, TKE. Rationale: increase strength, improve coordination, improve balance and increase proprioception  to improve the patients ability to begin ambulation without AD. 10 min Manual Therapy:  Left patellar mobs, DTM quads, gastroc. Knee PROM end range flex/ext. Pt supine. The manual therapy interventions were performed at a separate and distinct time from the therapeutic activities interventions. Rationale: decrease pain, increase ROM, increase tissue extensibility and decrease trigger points to improve ease of performing functional activities. With   [x] TE   [] TA   [] neuro   [] other: Patient Education: [x] Review HEP    [] Progressed/Changed HEP based on:   [] positioning   [] body mechanics   [] transfers   [] heat/ice application    [] other:      Other Objective/Functional Measures:  Added forward/back weight shifts, added standing HR/TR's, added Right hip 3-way with 1 handrail support, added standing TKE with orange t-band resistance. PD ice post-treatment. Pain Level (0-10 scale) post treatment: 3/10    ASSESSMENT/Changes in Function: Demonstrates improved weight shift tolerance. Pt ambulating with Right axillary crutch only. Pt is making gradual progress. Lacking TKE however ROM is becoming more comfortable pt per. Continue PT to further increase Left LE strength, knee mobility to improve ease of performing ADL's and to wean off of axillary crutch. Patient will continue to benefit from skilled PT services to modify and progress therapeutic interventions, address functional mobility deficits, address ROM deficits, address strength deficits, analyze and address soft tissue restrictions, analyze and cue movement patterns and analyze and modify body mechanics/ergonomics to attain remaining goals. [x]  See Plan of Care  []  See progress note/recertification  []  See Discharge Summary         Progress towards goals / Updated goals:  Short Term Goals: To be accomplished in 2 weeks:  1. Pt will report daily compliance with HEP at least twice daily to maximize therapeutic success.              Eval: HEP assigned  Current: met 1/3/22 HEP understanding and compliance   2. Pt will demonstrate SLR without assistance to improve independence with HEP.             EvalJarold Ovens required               ORDTKEX: met, independent but quad lag (1/7/2021)  Long Term Goals: To be accomplished in 8 weeks:  1. Pt will demonstrate 10 SLR without quad lag to improve knee stability for ambulation.              Eval: 5 with Morales              Current: Extension lag present. 1/14/2022   2. Pt will improve her left knee strength to 4+/5 MMT to improve stability for stair negotiation and squatting.              Eval: 2/5 quad, 2/5 HS secondary to pain  3. Pt will improve her left knee ROM to 0-120 deg to improve gait mechanics.             Eval: 0-17-85 deg              Current: 0- deg (1/10/2022)  4.  Pt will ambulate without AD for at least 150 ft with minimal to no difficulty to improve independence with mobility.             Eval: axillary crutches NWB x 1-2 weeks  5. Pt will improve her FOTO to 70, demonstrating improved functional ADL capacity.               Eval: 40    PLAN  []  Upgrade activities as tolerated     [x]  Continue plan of care  []  Update interventions per flow sheet       []  Discharge due to:_  []  Other:_      Chen Rubi, PTA 1/17/2022  10:36 AM    Future Appointments   Date Time Provider Graciela Loyd   1/19/2022 11:15 AM Deshaun Mcconnell, PTA [facility]   1/20/2022 10:15 AM STACEY Motta [facility]   1/21/2022 10:45 AM Olam Amas, PTA [facility]   1/24/2022 10:30 AM Olam Amas, PTA [facility]   1/26/2022 11:15 AM Jermaine Rodriguez, PT [facility]   1/28/2022 10:45 AM Olam Amas, PTA [facility]

## 2022-01-19 ENCOUNTER — HOSPITAL ENCOUNTER (OUTPATIENT)
Dept: PHYSICAL THERAPY | Age: 22
Discharge: HOME | End: 2022-01-19
Attending: PHYSICIAN ASSISTANT
Payer: COMMERCIAL

## 2022-01-19 PROCEDURE — 97112 NEUROMUSCULAR REEDUCATION: CPT

## 2022-01-19 PROCEDURE — 97110 THERAPEUTIC EXERCISES: CPT

## 2022-01-19 NOTE — PROGRESS NOTES
PT DAILY TREATMENT NOTE     Patient Name: Tobias Shoemaker  Date:2022  : 2000  [x]  Patient  Verified  Payor: [employer] / Plan: [address] / Product Type: Managed Care Medicaid /    In time: 11:15  Out time:12:04  Total Treatment Time (min): 49  Visit #:  of 24    Treatment Area: Left knee pain [M25.562]  Status post knee surgery [Z98.890]    SUBJECTIVE  Pain Level (0-10 scale): 2  Any medication changes, allergies to medications, adverse drug reactions, diagnosis change, or new procedure performed?: [x] No    [] Yes (see summary sheet for update)  Subjective functional status/changes:   [] No changes reported  Pt reports she continues to work on her weight shifting and her tolerance is getting better and better. OBJECTIVE    31 min Therapeutic Exercise:  [x] See flow sheet :   Rationale: increase ROM and increase strength to improve the patients ability to perform functional task with ease. 8 min Neuromuscular Re-education:  [x]  See flow sheet :   Rationale: increase strength, improve coordination and increase proprioception  to improve the patients ability to perform ADL's with ease. 10 min Manual Therapy:   Left patellar mobs, DTM quads, gastroc. Knee PROM end range flex/ext. Pt supine. The manual therapy interventions were performed at a separate and distinct time from the therapeutic activities interventions. Rationale: decrease pain, increase ROM, increase tissue extensibility and decrease trigger points to improve functional mobility with ambulation ADL's.           With   [] TE   [] TA   [] neuro   [] other: Patient Education: [x] Review HEP    [] Progressed/Changed HEP based on:   [] positioning   [] body mechanics   [] transfers   [] heat/ice application    [] other:      Other Objective/Functional Measures: Pt reports she is to return back to her job on light duty on 22     Pain Level (0-10 scale) post treatment: 2    ASSESSMENT/Changes in Function:   Pt continues to lack some TKE of the left knee noted throughout therex but notes her pain with extension is decreasing. Pt does continue to display improved left knee flexion and notes and displays improvements with her WB'ing tolerance. Continued treatment to improve left knee ROM and mobility to aid with ease of ambulation ADL's. Patient will continue to benefit from skilled PT services to modify and progress therapeutic interventions, address functional mobility deficits, address ROM deficits, address strength deficits, analyze and address soft tissue restrictions, analyze and cue movement patterns, analyze and modify body mechanics/ergonomics and assess and modify postural abnormalities to attain remaining goals. [x]  See Plan of Care  []  See progress note/recertification  []  See Discharge Summary         Progress towards goals / Updated goals:  Short Term Goals: To be accomplished in 2 weeks:  1. Pt will report daily compliance with HEP at least twice daily to maximize therapeutic success.              Eval: HEP assigned  Current: met 1/3/22 HEP understanding and compliance   2. Pt will demonstrate SLR without assistance to improve independence with HEP.             Bryanpartha Virginiavalentin required   Current: met, independent but quad lag (1/7/2021)  Long Term Goals: To be accomplished in 8 weeks:  1. Pt will demonstrate 10 SLR without quad lag to improve knee stability for ambulation.              Eval: 5 with Morales  Current: Extension lag present. 1/14/2022   2. Pt will improve her left knee strength to 4+/5 MMT to improve stability for stair negotiation and squatting.              Eval: 2/5 quad, 2/5 HS secondary to pain  3. Pt will improve her left knee ROM to 0-120 deg to improve gait mechanics.             Eval: 0-17-85 deg  Current: 0- deg (1/10/2022)  4.  Pt will ambulate without AD for at least 150 ft with minimal to no difficulty to improve independence with mobility.             Eval: axillary crutches NWB x 1-2 week  Current: progressing 1/19/22 1 axillary crutch and  WBAT  5. Pt will improve her FOTO to 70, demonstrating improved functional ADL capacity.               Eval: 40     PLAN  []  Upgrade activities as tolerated     [x]  Continue plan of care  []  Update interventions per flow sheet       []  Discharge due to:_  []  Other:_      Rebekah Isaac PTA 1/19/2022  11:19 AM    Future Appointments   Date Time Provider Graciela Loyd   1/20/2022 10:15 AM STACEY Palma [facility]   1/21/2022 10:45 AM Humera Marcelino, PTA [facility]   1/24/2022 10:30 AM Humera Marcelino, PTA [facility]   1/26/2022 11:15 AM Dariela Appl, PT [facility]   1/28/2022 10:45 AM Humera Marcelino, PTA [facility]

## 2022-01-20 ENCOUNTER — OFFICE VISIT (OUTPATIENT)
Dept: ORTHOPEDIC SURGERY | Age: 22
End: 2022-01-20
Payer: COMMERCIAL

## 2022-01-20 VITALS
TEMPERATURE: 97.8 F | WEIGHT: 135 LBS | OXYGEN SATURATION: 97 % | BODY MASS INDEX: 23.92 KG/M2 | HEIGHT: 63 IN | HEART RATE: 94 BPM

## 2022-01-20 DIAGNOSIS — S83.512A RUPTURE OF ANTERIOR CRUCIATE LIGAMENT OF LEFT KNEE, INITIAL ENCOUNTER: Primary | ICD-10-CM

## 2022-01-20 PROCEDURE — 99024 POSTOP FOLLOW-UP VISIT: CPT | Performed by: PHYSICIAN ASSISTANT

## 2022-01-20 NOTE — PROGRESS NOTES
Patient: Kalen Gonzalez  YOB: 2000       HISTORY:  The patient presents for reevaluation of her left knee status post arthroscopic ACL reconstruction with partial medial menisectomy on 12/29/21. Patient is improved, states pain is a 2 out of 10.  she has gone to physical therapy. Patient denies any fever, chills, chest pain, shortness of breath or calf pain. The remainder of the review of systems is negative. There are no new illness or injuries to report since last seen in the office. No changes in medications, allergies, social or family history. PHYSICAL EXAMINATION:    Visit Vitals  Pulse 94   Ht 5' 2.5\" (1.588 m)   Wt 135 lb (61.2 kg)   SpO2 97%   BMI 24.30 kg/m²     The patient is a well-developed, well-nourished female in no acute distress. The patient is alert and oriented times three. The patient appears to be well groomed. Mood and affect are normal.   ORTHOPEDIC EXAM of Left knee: Inspection: Effusion not present,  incisions clean, dry intact, sutures in place  TTP: none  Range of motion: 5-120 flexion  Stability: Stable  Strength: 4/5  2+ distal pulses  Slight lag with straight leg raise    IMPRESSION:  Status post Left knee arthroscopic ACL reconstruction with partial medial menisectomy. PLAN:   Incisions cleaned. Surgery was discussed at length today. Stressed to patient that nothing causes an increase in pain or swelling. Patient is WBAT. Will cont with physical therapy. Patient will follow up in 4 weeks.     NAVNEET Muniz [facility]

## 2022-01-20 NOTE — LETTER
NOTIFICATION RETURN TO WORK / SCHOOL    1/20/2022 10:36 AM    Ms. [address]  [address] [facility],  [address]      To Whom It May Concern:    Tiana Reddy is currently under the care of [address] [place] [address] Harsha Alba. She will return to work/school on 1/20/22 with the following restrictions: no standing greater than 5 mins, no lifting greater than 5lbs, no pushing, no pulling. Primarily desk duty only. If there are questions or concerns please have the patient contact our office.         Sincerely,      STACEY Cardona Objective info 9/18/18  Middle Finger  Date 8/21/2018 8/21/2018 8/28/18 9/4/18 9/18/18   Side L R L L L   P1 6.6 5.8 6.4 6.2 6.2   IP 6.6 6.0 6.0 WNL    P2          Please refer to the daily flowsheet for treatment today, total treatment time and time spent performing 1:1 timed codes.

## 2022-01-21 ENCOUNTER — HOSPITAL ENCOUNTER (OUTPATIENT)
Dept: PHYSICAL THERAPY | Age: 22
Discharge: HOME | End: 2022-01-21
Attending: PHYSICIAN ASSISTANT
Payer: COMMERCIAL

## 2022-01-21 PROCEDURE — 97110 THERAPEUTIC EXERCISES: CPT

## 2022-01-21 PROCEDURE — 97112 NEUROMUSCULAR REEDUCATION: CPT

## 2022-01-21 PROCEDURE — 97530 THERAPEUTIC ACTIVITIES: CPT

## 2022-01-21 NOTE — PROGRESS NOTES
In [address]  [address]  [place], [address]  [phone] fax    Physical Therapy Progress Note  Patient name: Jaclyn Rios Start of Care: 2021   Referral source: [place] : 2000   Medical/Treatment Diagnosis: Left knee pain [M25.562]  Status post knee surgery [Z98.890]  Payor: Maddie 22 / Plan: [address] / Product Type: Managed Care Medicaid /  Onset Date:2021 DOS     Prior Hospitalization: see medical history Provider#: [id number]   Medications: Verified on Patient Summary List    Comorbidities: meniscus tear 1.5 years ago, allergies  Prior Level of Function:pt was running regularly, performing barbell squats, regular gym attendance without knee pain  Visits from Start of Care: 10    Missed Visits: 0      Established Goals:        Excellent         Good         Limited            None  [x] Increased ROM   [x]  []  [x]  []  [x] Increased Strength  []  [x]  []  []  [x] Increased Mobility  []  []  [x]  []   [x] Decreased Pain   []  [x]  []  []  [x] Decreased Swelling  []  [x]  []  []    Key Functional Changes:     Progress towards goals / Updated goals:  Short Term Goals: To be accomplished in 2 weeks:  1. Pt will report daily compliance with HEP at least twice daily to maximize therapeutic success.              Eval: HEP assigned              Current: met 1/3/22 HEP understanding and compliance   2. Pt will demonstrate SLR without assistance to improve independence with HEP.             Eval: Morales required               Current: met, independent but quad lag. Long Term Goals: To be accomplished in 8 weeks:  1. Pt will demonstrate 10 SLR without quad lag to improve knee stability for ambulation.              Eval: 5 with Morales              Current: Extension lag present.   2. Pt will improve her left knee strength to 4+/5 MMT to improve stability for stair negotiation and squatting.              Eval: 2/5 quad, 2/5 HS secondary to pain              Current: MMT Left HS 3+/5, quad 3+/5.  3. Pt will improve her left knee ROM to 0-120 deg to improve gait mechanics.             Eval: 0-17-85 deg              Current: 0- deg. 4. Pt will ambulate without AD for at least 150 ft with minimal to no difficulty to improve independence with mobility.             Eval: axillary crutches NWB x 1-2 week              Current: progressing, 1 axillary crutch and  WBAT  5. Pt will improve her FOTO to 70, demonstrating improved functional ADL capacity.             Eval: 44              Current: Progressing, 55%. FOTO 55%. MMT Left HS 3+/5, quad 3+/5. Pt is lacking TKE however ROM is improving. Slight extension lag with SLR flexion. Added 2\" step ups and mini-squats, able to perform with good form and body mechanics. Pt is currently just over 3 weeks post-op. Ambulating with Left knee brace and Right axillary crutch in the community. Updated Goals: to be achieved in 4 weeks:  1. Pt will demonstrate 10 SLR without quad lag to improve knee stability for ambulation. PN: Extension lag present. 2. Pt will improve her left knee strength to 4+/5 MMT to improve stability for stair negotiation and squatting. PN: MMT Left HS 3+/5, quad 3+/5.  3. Pt will improve her left knee ROM to 0-120 deg to improve gait mechanics. PN: 0- deg. 4. Pt will ambulate without AD for at least 150 ft with minimal to no difficulty to improve independence with mobility. PN: progressing, 1 axillary crutch and  WBAT  5. Pt will improve her FOTO to 70, demonstrating improved functional ADL capacity. PN: Progressing, 55%. ASSESSMENT/RECOMMENDATIONS:  Pt would benefit from continuing PT to increase Left knee ext ROM, increase Left LE strength to normalize gait pattern.   [x]Continue therapy per initial plan/protocol at a frequency of  2 x per week for 8 weeks  []Continue therapy with the following recommended changes:_____________________      _____________________________________________________________________  []Discontinue therapy progressing towards or have reached established goals  []Discontinue therapy due to lack of appreciable progress towards goals  []Discontinue therapy due to lack of attendance or compliance  []Await Physician's recommendations/decisions regarding therapy  []Other:________________________________________________________________    Thank you for this referral.    Hetal Allen PTA 1/21/2022 11:58 AM  NOTE TO PHYSICIAN:  [address]   FAX TO [facility]: [phone]  If you are unable to process this request in 24 hours please contact our office: [phone]    ? I have read the above report and request that my patient continue as recommended. ? I have read the above report and request that my patient continue therapy with the following changes/special instructions:__________________________________________________________  ? I have read the above report and request that my patient be discharged from therapy.     Physicians signature: ______________________________Date: ______Time:______     STACEY Aparicio

## 2022-01-21 NOTE — PROGRESS NOTES
PT DAILY TREATMENT NOTE     Patient Name: Tiana Reddy  Date:2022  : 2000   [x]  Patient  Verified  Payor: Maddie 22 / Plan: [address] / Product Type: Managed Care Medicaid /    In time:10:46  Out time:11:42  Total Treatment Time (min): 56  Visit #: 10 of 24    Treatment Area: Left knee pain [M25.562]  Status post knee surgery [Z98.890]    SUBJECTIVE  Pain Level (0-10 scale): 5/10  Any medication changes, allergies to medications, adverse drug reactions, diagnosis change, or new procedure performed?: [x] No    [] Yes (see summary sheet for update)  Subjective functional status/changes:   [] No changes reported  \"It's very sore today. I've been working today and I've been trying to sleep with it straight so it hurts more than usual.\"    OBJECTIVE    Modality rationale: decrease inflammation and decrease pain to improve the patients ability to perform ADL's.    Min Type Additional Details    [] Estim:  []Unatt       []IFC  []Premod                        []Other:  []w/ice   []w/heat  Position:  Location:    [] Estim: []Att    []TENS instruct  []NMES                    []Other:  []w/US   []w/ice   []w/heat  Position:  Location:    []  Traction: [] Cervical       []Lumbar                       [] Prone          []Supine                       []Intermittent   []Continuous Lbs:  [] before manual  [] after manual    []  Ultrasound: []Continuous   [] Pulsed                           []1MHz   []3MHz W/cm2:  Location:    []  Iontophoresis with dexamethasone         Location: [] Take home patch   [] In clinic    []  Ice     []  heat  []  Ice massage  []  Laser   []  Anodyne Position:  Location:    []  Laser with stim  []  Other:  Position:  Location:   10 [x]  Vasopneumatic Device    []  Right     [x]  Left  Pre-treatment girth:  Post-treatment girth:  Measured at (location):  Pressure:       [x] lo [] med [] hi   Temperature: [x] lo [] med [] hi   [] Skin assessment post-treatment:  []intact []redness- no adverse reaction    []redness  adverse reaction:     18 min Therapeutic Exercise:  [x] See flow sheet :   Rationale: increase ROM and increase strength to improve the patients ability to perform ADL's. 10 min Therapeutic Activity:  [x]  See flow sheet : Squats, 2\" step ups forward. Rationale: increase strength, improve coordination and increase proprioception  to improve the patients ability to perform ADL's.     8 min Neuromuscular Re-education:  [x]  See flow sheet : TKE, Left SLS balance. Rationale: increase strength, improve coordination, improve balance and increase proprioception  to improve the patients ability to perform functional activities. 10 min Manual Therapy:  Left patellar mobs, STM/DTM Left quads, distal HS, gastroc. Manual HS and gastro stretch. Knee PROM ext with over pressure. Pt supine. The manual therapy interventions were performed at a separate and distinct time from the therapeutic activities interventions. Rationale: decrease pain, increase ROM, increase tissue extensibility and decrease trigger points to improve ease with ambulation and       With   [x] TE   [] TA   [] neuro   [] other: Patient Education: [x] Review HEP    [] Progressed/Changed HEP based on:   [] positioning   [] body mechanics   [] transfers   [] heat/ice application    [] other:      Other Objective/Functional Measures: FOTO 55%. MMT Left HS 3+/5, quad 3+/5. Pt is lacking TKE however ROM is improving. Slight extension lag with SLR flexion. Added 2\" step ups and mini-squats, able to perform with good form and body mechanics. Pt is currently just over 3 weeks post-op. Ambulating with Left knee brace and Right axillary crutch in the community. Pt would benefit from continuing PT to increase Left knee ext ROM, increase Left LE strength to normalize gait pattern.     Pain Level (0-10 scale) post treatment: 6/10    ASSESSMENT/Changes in Function:      []  See Plan of Care  [x]  See progress note/recertification  []  See Discharge Summary         Progress towards goals / Updated goals:  Short Term Goals: To be accomplished in 2 weeks:  1. Pt will report daily compliance with HEP at least twice daily to maximize therapeutic success.              Eval: HEP assigned   Current: met 1/3/22 HEP understanding and compliance   2. Pt will demonstrate SLR without assistance to improve independence with HEP.             EvalFayrene Showman required    Current: met, independent but quad lag (1/7/2021)  Long Term Goals: To be accomplished in 8 weeks:  1. Pt will demonstrate 10 SLR without quad lag to improve knee stability for ambulation.              Eval: 5 with Morales   Current: Extension lag present. 1/14/2022   2. Pt will improve her left knee strength to 4+/5 MMT to improve stability for stair negotiation and squatting.              Eval: 2/5 quad, 2/5 HS secondary to pain   Current: MMT Left HS 3+/5, quad 3+/5. 1/21/2022  3. Pt will improve her left knee ROM to 0-120 deg to improve gait mechanics.             Eval: 0-17-85 deg   Current: 0- deg (1/10/2022)  4. Pt will ambulate without AD for at least 150 ft with minimal to no difficulty to improve independence with mobility.             Eval: axillary crutches NWB x 1-2 week   Current: progressing 1/19/22 1 axillary crutch and  WBAT  5. Pt will improve her FOTO to 70, demonstrating improved functional ADL capacity.               Eval: 44   Current: Progressing, 55%. 1/21/2022     PLAN  []  Upgrade activities as tolerated     [x]  Continue plan of care  []  Update interventions per flow sheet       []  Discharge due to:_  []  Other:_      Deboraha Hum, PTA 1/21/2022  10:37 AM    Future Appointments   Date Time Provider Graciela Loyd   1/21/2022 10:45 AM Humera Marcelino, PTA [facility]   1/24/2022 10:30 AM Humera Marcelino, PTA [facility]   1/26/2022 11:15 AM Dariela Appl, PT [facility]   1/28/2022 10:45 AM [facility] Lynn[facility]

## 2022-01-24 ENCOUNTER — HOSPITAL ENCOUNTER (OUTPATIENT)
Dept: PHYSICAL THERAPY | Age: 22
Discharge: HOME | End: 2022-01-24
Attending: PHYSICIAN ASSISTANT
Payer: COMMERCIAL

## 2022-01-24 PROCEDURE — 97112 NEUROMUSCULAR REEDUCATION: CPT

## 2022-01-24 PROCEDURE — 97110 THERAPEUTIC EXERCISES: CPT

## 2022-01-24 PROCEDURE — 97530 THERAPEUTIC ACTIVITIES: CPT

## 2022-01-24 NOTE — PROGRESS NOTES
PT DAILY TREATMENT NOTE     Patient Name: Carl Fitzgerald  Date:2022  : 2000  [x]  Patient  Verified  Payor: Maddie 22 / Plan: [address] / Product Type: Managed Care Medicaid /    In time:10:30  Out time:11:26  Total Treatment Time (min): 64  Visit #: 1 of 16    Treatment Area: Left knee pain [M25.562]  Status post knee surgery [Z98.890]    SUBJECTIVE  Pain Level (0-10 scale): 5/10  Any medication changes, allergies to medications, adverse drug reactions, diagnosis change, or new procedure performed?: [x] No    [] Yes (see summary sheet for update)  Subjective functional status/changes:   [] No changes reported  \"It's a little sore. Worked out for the first time but it was my upper body and abs. Trying to wean off of the crutches. \" Ambulating with Left knee brace, no AD today. OBJECTIVE    Modality rationale: decrease inflammation and decrease pain to improve the patients ability to perform ADL's.    Min Type Additional Details    [] Estim:  []Unatt       []IFC  []Premod                        []Other:  []w/ice   []w/heat  Position:  Location:    [] Estim: []Att    []TENS instruct  []NMES                    []Other:  []w/US   []w/ice   []w/heat  Position:  Location:    []  Traction: [] Cervical       []Lumbar                       [] Prone          []Supine                       []Intermittent   []Continuous Lbs:  [] before manual  [] after manual    []  Ultrasound: []Continuous   [] Pulsed                           []1MHz   []3MHz W/cm2:  Location:    []  Iontophoresis with dexamethasone         Location: [] Take home patch   [] In clinic    []  Ice     []  heat  []  Ice massage  []  Laser   []  Anodyne Position:  Location:    []  Laser with stim  []  Other:  Position:  Location:   10 [x]  Vasopneumatic Device    []  Right     [x]  Left  Pre-treatment girth:  Post-treatment girth:  Measured at (location):  Pressure:       [x] lo [] med [] hi   Temperature: [x] lo [] med [] hi   [] Skin assessment post-treatment:  []intact []redness- no adverse reaction    []redness  adverse reaction:     23 min Therapeutic Exercise:  [x] See flow sheet :   Rationale: increase ROM and increase strength to improve the patients ability to perform ADL's. 10 min Therapeutic Activity:  [x]  See flow sheet : Step ups forward/lateral, squats. Rationale: increase strength, improve coordination and increase proprioception  to improve the patients ability to perform ADL's.     8 min Neuromuscular Re-education:  [x]  See flow sheet : Left SLS on level surface, TKE. Rationale: increase strength, improve coordination, improve balance and increase proprioception  to improve the patients ability to perform functional activities. 5 min Manual Therapy:  Left patellar mobs, STM/DTM Left quads, distal HS, gastroc. Manual HS and gastro stretch. Knee PROM ext with over pressure. Pt supine. Left patellar mobs, STM/DTM Left quads, distal HS, gastroc. Manual HS and gastro stretch. Knee PROM ext with over pressure. Pt supine. The manual therapy interventions were performed at a separate and distinct time from the therapeutic activities interventions. Rationale: decrease pain, increase ROM, increase tissue extensibility and decrease trigger points to improve heel strike during gait. With   [x] TE   [] TA   [] neuro   [] other: Patient Education: [x] Review HEP    [] Progressed/Changed HEP based on:   [] positioning   [] body mechanics   [] transfers   [] heat/ice application    [] other:      Other Objective/Functional Measures: Added lateral 4\" step ups 10 reps. Anterior knee pressure with end range squats, good mechanics with a slight right weight shift. Pain Level (0-10 scale) post treatment: 5/10    ASSESSMENT/Changes in Function: Pt is making gradual progress. Elevated pain level today secondary to being on her feet more than usual and weaning off of (B) axillary crutches.  Instructed pt to continue icing her knee regularly. No difficulty with performing exercises, lacking TKE however achieved neutral ext passively. Continue PT to increase Left LE strength and knee ROM to improve ease of performing functional activities. Patient will continue to benefit from skilled PT services to modify and progress therapeutic interventions, address functional mobility deficits, address ROM deficits, address strength deficits, analyze and address soft tissue restrictions, analyze and cue movement patterns and analyze and modify body mechanics/ergonomics to attain remaining goals. [x]  See Plan of Care  []  See progress note/recertification  []  See Discharge Summary         Progress towards goals / Updated goals:  Updated Goals: to be achieved in 4 weeks:  1. Pt will demonstrate 10 SLR without quad lag to improve knee stability for ambulation.              PN: Extension lag present. 2. Pt will improve her left knee strength to 4+/5 MMT to improve stability for stair negotiation and squatting.              PN: MMT Left HS 3+/5, quad 3+/5.  3. Pt will improve her left knee ROM to 0-120 deg to improve gait mechanics.             DF: 0- deg. 4. Pt will ambulate without AD for at least 150 ft with minimal to no difficulty to improve independence with mobility.             FE: progressing, 1 axillary crutch and  WBAT  5. Pt will improve her FOTO to 70, demonstrating improved functional ADL capacity.             QU: [id number], 55%.     PLAN  []  Upgrade activities as tolerated     [x]  Continue plan of care  []  Update interventions per flow sheet       []  Discharge due to:_  []  Other:_      Marleny Cowart PTA 1/24/2022  10:23 AM    Future Appointments   Date Time Provider Graciela Loyd   1/24/2022 10:30 AM Hermilo Lake PTA [facility]   1/26/2022 11:15 AM Elma Ortiz PT [facility]   1/28/2022 10:45 AM Hermilo Lake PTA [facility]

## 2022-01-26 ENCOUNTER — HOSPITAL ENCOUNTER (OUTPATIENT)
Dept: PHYSICAL THERAPY | Age: 22
Discharge: HOME | End: 2022-01-26
Attending: PHYSICIAN ASSISTANT
Payer: COMMERCIAL

## 2022-01-26 PROCEDURE — 97110 THERAPEUTIC EXERCISES: CPT

## 2022-01-26 PROCEDURE — 97530 THERAPEUTIC ACTIVITIES: CPT

## 2022-01-26 PROCEDURE — 97112 NEUROMUSCULAR REEDUCATION: CPT

## 2022-01-26 NOTE — PROGRESS NOTES
PT DAILY TREATMENT NOTE     Patient Name: Lore Melo  Date:2022  : 2000  [x]  Patient  Verified  Payor: Maddie 22 / Plan: [address] / Product Type: Managed Care Medicaid /    In time:1112am  Out time:1210pm  Total Treatment Time (min): 62  Visit #: 2 of 16    Treatment Area: Left knee pain [M25.562]  Status post knee surgery [Z98.890]    SUBJECTIVE  Pain Level (0-10 scale): 3  Any medication changes, allergies to medications, adverse drug reactions, diagnosis change, or new procedure performed?: [x] No    [] Yes (see summary sheet for update)  Subjective functional status/changes:   [] No changes reported  Pt reports pain isn't bad today. OBJECTIVE    Modality rationale: decrease inflammation and decrease pain to improve the patients ability to perform ADL's. Min Type Additional Details   10 [x]  Vasopneumatic Device    []  Right     [x]  Left  Pre-treatment girth:  Post-treatment girth:  Measured at (location):  Pressure:       [x] lo [] med [] hi   Temperature: [x] lo [] med [] hi   [] Skin assessment post-treatment:  []intact []redness- no adverse reaction    []redness  adverse reaction:   28 min Therapeutic Exercise:  [x]? See flow sheet :   Rationale: increase ROM and increase strength to improve the patients ability to manage ADL's.     10 min Therapeutic Activity:  [x]? See flow sheet : Step ups forward/lateral, squats. Rationale: increase strength, improve coordination and increase proprioception  to improve the patients ability to manage functional activities with greater ease. .     8 min Neuromuscular Re-education:  [x]? See flow sheet : Left SLS on level surface, TKE.    Rationale: increase strength, improve coordination, improve balance and increase proprioception  to improve the patients ability to perform functional activities.     2 min Manual Therapy:  pt supine -- GR III/IV superior patellar oscillations with knee extension overpressure. The manual therapy interventions were performed at a separate and distinct time from the therapeutic activities interventions. Rationale: decrease pain, increase ROM, increase tissue extensibility and decrease trigger points to improve heel strike during gait.           With   [] TE   [] TA   [] neuro   [] other: Patient Education: [x] Review HEP    [] Progressed/Changed HEP based on:   [] positioning   [] body mechanics   [] transfers   [] heat/ice application    [] other:      Other Objective/Functional Measures: see progress towards goals     Pain Level (0-10 scale) post treatment: 4    ASSESSMENT/Changes in Function: Pt does very well with exercise progressions, including increased step height for forward step ups, initiation of leg press, increased banded and weighted resistance of therEx, and initiation of prone knee hang and ely stretching. Pt expresses improved anterior knee pressure following ely stretching and improved knee extension comfort following prone knee hang. Able to achieve 0-130 deg with PROM today. Instructed pt to begin prone knee hang stretching at home for 3 minute intervals throughout the day and ely stretching as tolerated, not increasing anterior or medial knee pain. Advised that she may apply some heat to the posterior aspect of the HS/gastroc as long as no adverse effects occur (swelling), but to continue with ice after exercise. Discussed backwards walking on TM slowly at her local gym to assist with active extension with gait.      Patient will continue to benefit from skilled PT services to modify and progress therapeutic interventions, address functional mobility deficits, address ROM deficits, address strength deficits, analyze and address soft tissue restrictions, analyze and cue movement patterns, analyze and modify body mechanics/ergonomics, assess and modify postural abnormalities, address imbalance/dizziness and instruct in home and community integration to attain remaining goals. [x]  See Plan of Care  []  See progress note/recertification  []  See Discharge Summary         Progress towards goals / Updated goals:  1. Pt will demonstrate 10 SLR without quad lag to improve knee stability for ambulation.              PN: Extension lag present. 2. Pt will improve her left knee strength to 4+/5 MMT to improve stability for stair negotiation and squatting.              PN: MMT Left HS 3+/5, quad 3+/5.  3. Pt will improve her left knee AROM to 0-120 deg to improve gait mechanics.             BL: 0- deg. Current: progressing, PROM 0-130 deg, AROM 0- deg (1/26/2022)  4. Pt will ambulate without AD for at least 150 ft with minimal to no difficulty to improve independence with mobility.             PN: progressing, 1 axillary crutch and  WBAT    Current: ambulates without AD, lacking terminal knee ext (1/26/2022)  5. Pt will improve her FOTO to 70, demonstrating improved functional ADL capacity.             DW: KRISTIAN, 55%.        PLAN  []  Upgrade activities as tolerated     [x]  Continue plan of care  []  Update interventions per flow sheet       []  Discharge due to:_  []  Other:_      Noah Capone, PT 1/26/2022  11:11 AM    Future Appointments   Date Time Provider Graciela Loyd   1/26/2022 11:15 AM Marixa Resides, PT [facility]   1/28/2022 10:45 AM Dennis Fearing, PTA [facility]   1/31/2022 11:15 AM Dennis Fearing, PTA [facility]   2/2/2022 10:30 AM Dennis Fearing, PTA [facility]   2/7/2022 10:30 AM Dennis Fearing, PTA [facility]   2/9/2022 11:15 AM Arsalan Ashing, PTA [facility]   2/14/2022 11:30 AM Marixa Resides, PT [facility]

## 2022-01-28 ENCOUNTER — HOSPITAL ENCOUNTER (OUTPATIENT)
Dept: PHYSICAL THERAPY | Age: 22
Discharge: HOME | End: 2022-01-28
Attending: PHYSICIAN ASSISTANT
Payer: COMMERCIAL

## 2022-01-28 PROCEDURE — 97530 THERAPEUTIC ACTIVITIES: CPT

## 2022-01-28 PROCEDURE — 97110 THERAPEUTIC EXERCISES: CPT

## 2022-01-28 PROCEDURE — 97112 NEUROMUSCULAR REEDUCATION: CPT

## 2022-01-28 NOTE — PROGRESS NOTES
PT DAILY TREATMENT NOTE     Patient Name: Zulma Tijerina  Date:2022  : 2000  [x]  Patient  Verified  Payor: Maddie Sanchez / Plan: [address] / Product Type: Managed Care Medicaid /    In time:10:44  Out time:1:29  Total Treatment Time (min): 45  Visit #: 3 of 16    Treatment Area: Left knee pain [M25.562]  Status post knee surgery [Z98.890]    SUBJECTIVE  Pain Level (0-10 scale): 2/10  Any medication changes, allergies to medications, adverse drug reactions, diagnosis change, or new procedure performed?: [x] No    [] Yes (see summary sheet for update)  Subjective functional status/changes:   [x] No changes reported    OBJECTIVE    20 min Therapeutic Exercise:  [x] See flow sheet :   Rationale: increase ROM and increase strength to improve the patients ability to perform ADL's. 10 min Therapeutic Activity:  [x]  See flow sheet : Step ups forward/lateral, squats. Rationale: increase strength, improve coordination and increase proprioception  to improve the patients ability to perform functional activities. 10 min Neuromuscular Re-education:  [x]  See flow sheet : Left SLS on level surface, TKE, eccentric step downs. Rationale: increase strength, improve coordination, improve balance and increase proprioception  to improve the patients ability to perform functional activities. 5 min Manual Therapy:  Left patellar mobs, STM/DTM Left quads and ITB. Knee PROM with ext emphasis. Pt supine. The manual therapy interventions were performed at a separate and distinct time from the therapeutic activities interventions. Rationale: decrease pain, increase ROM, increase tissue extensibility and decrease trigger points to normalize gait pattern.     With   [x] TE   [] TA   [] neuro   [] other: Patient Education: [x] Review HEP    [] Progressed/Changed HEP based on:   [] positioning   [] body mechanics   [] transfers   [] heat/ice application    [] other:      Other Objective/Functional Measures: Added peach t-band to hip 3-way series. Changed HR's to SL 2 sets of 10 reps. Added eccentric 2\" step down 10 reps. Pain Level (0-10 scale) post treatment: 4/10    ASSESSMENT/Changes in Function: PT is making gradual progress. Demonstrates improved Left knee stability with new exercises and Left knee ROM as pt presents with improved Left heel strike. Continue PT per protocol to further increase Left LE strength and Left knee ROM . Patient will continue to benefit from skilled PT services to modify and progress therapeutic interventions, address functional mobility deficits, address ROM deficits, address strength deficits, analyze and address soft tissue restrictions, analyze and cue movement patterns and analyze and modify body mechanics/ergonomics to attain remaining goals. [x]  See Plan of Care  []  See progress note/recertification  []  See Discharge Summary         Progress towards goals / Updated goals:  1. Pt will demonstrate 10 SLR without quad lag to improve knee stability for ambulation.              PN: Extension lag present. 2. Pt will improve her left knee strength to 4+/5 MMT to improve stability for stair negotiation and squatting.              PN: MMT Left HS 3+/5, quad 3+/5.  3. Pt will improve her left knee AROM to 0-120 deg to improve gait mechanics.             LB: 0- deg. Current: progressing, PROM 0-130 deg, AROM 0- deg (1/26/2022)  4. Pt will ambulate without AD for at least 150 ft with minimal to no difficulty to improve independence with mobility.             PN: progressing, 1 axillary crutch and  WBAT               Current: ambulates without AD, lacking terminal knee ext (1/26/2022)  5. Pt will improve her FOTO to 70, demonstrating improved functional ADL capacity.             LP: [id number], 55%.        PLAN  []  Upgrade activities as tolerated     [x]  Continue plan of care  []  Update interventions per flow sheet []  Discharge due to:_  []  Other:_      Atul Jacobsen, PTA 1/28/2022  10:50 AM    Future Appointments   Date Time Provider Graciela Loyd   1/31/2022 11:15 AM Milderd Mode, PTA [facility]   2/2/2022 10:30 AM Milderd Mode, PTA [facility]   2/7/2022 10:30 AM Milderd Mode, PTA [facility]   2/9/2022 11:15 AM Lafonda Pea, PTA [facility]   2/14/2022 11:30 AM Oriental orthodox Six, PT [facility]

## 2022-01-31 ENCOUNTER — HOSPITAL ENCOUNTER (OUTPATIENT)
Dept: PHYSICAL THERAPY | Age: 22
Discharge: HOME | End: 2022-01-31
Attending: PHYSICIAN ASSISTANT
Payer: COMMERCIAL

## 2022-01-31 PROCEDURE — 97530 THERAPEUTIC ACTIVITIES: CPT

## 2022-01-31 PROCEDURE — 97110 THERAPEUTIC EXERCISES: CPT

## 2022-01-31 PROCEDURE — 97112 NEUROMUSCULAR REEDUCATION: CPT

## 2022-01-31 NOTE — PROGRESS NOTES
PT DAILY TREATMENT NOTE     Patient Name: Frances Spine  Date:2022  : 2000  [x]  Patient  Verified  Payor: Maddie 22 / Plan: [address] / Product Type: Managed Care Medicaid /    In time:11:10  Out time:12:!5  Total Treatment Time (min): 72  Visit #: 4 of 16    Treatment Area: Left knee pain [M25.562]  Status post knee surgery [Z98.890]    SUBJECTIVE  Pain Level (0-10 scale): 210  Any medication changes, allergies to medications, adverse drug reactions, diagnosis change, or new procedure performed?: [x] No    [] Yes (see summary sheet for update)  Subjective functional status/changes:   [] No changes reported  \"Just a little pain, on top of the knee. \"    OBJECTIVE    Modality rationale: decrease inflammation and decrease pain to improve the patients ability to perform ADL's.    Min Type Additional Details    [] Estim:  []Unatt       []IFC  []Premod                        []Other:  []w/ice   []w/heat  Position:  Location:    [] Estim: []Att    []TENS instruct  []NMES                    []Other:  []w/US   []w/ice   []w/heat  Position:  Location:    []  Traction: [] Cervical       []Lumbar                       [] Prone          []Supine                       []Intermittent   []Continuous Lbs:  [] before manual  [] after manual    []  Ultrasound: []Continuous   [] Pulsed                           []1MHz   []3MHz W/cm2:  Location:    []  Iontophoresis with dexamethasone         Location: [] Take home patch   [] In clinic   10 [x]  Ice     []  heat  []  Ice massage  []  Laser   []  Anodyne Position: Reclined  Location: Left knee    []  Laser with stim  []  Other:  Position:  Location:    []  Vasopneumatic Device    []  Right     []  Left  Pre-treatment girth:  Post-treatment girth:  Measured at (location):  Pressure:       [] lo [] med [] hi   Temperature: [] lo [] med [] hi   [] Skin assessment post-treatment:  []intact []redness- no adverse reaction    []redness  adverse reaction:     30 min Therapeutic Exercise:  [x] See flow sheet :    Rationale: increase ROM and increase strength to improve the patients ability to perform ADL's. 10 min Therapeutic Activity:  [x]  See flow sheet : Step ups forward/lateral, squats. Rationale: increase strength, improve coordination and increase proprioception  to improve the patients ability to perform ADL's. 10 min Neuromuscular Re-education:  [x]  See flow sheet : Left SLS on level surface, TKE, eccentric step downs. Rationale: increase strength, improve balance and increase proprioception  to improve the patients ability to perform functional activities. 5 min Manual Therapy:  Left patellar mobs, STM/DTM Left quads and ITB. Knee PROM with ext emphasis. Pt supine. The manual therapy interventions were performed at a separate and distinct time from the therapeutic activities interventions. Rationale: decrease pain, increase ROM, increase tissue extensibility and decrease trigger points to improve ease of performing functional activities. With   [x] TE   [] TA   [] neuro   [] other: Patient Education: [x] Review HEP    [] Progressed/Changed HEP based on:   [] positioning   [] body mechanics   [] transfers   [] heat/ice application    [] other:      Other Objective/Functional Measures: Increased lateral step ups to 6\". Added airex to Left SLS to increase Left LE stability/balance. Pain Level (0-10 scale) post treatment: 2/10    ASSESSMENT/Changes in Function: Pt is making gradual progress, demonstrates improved stability and balance. Able to maintain Left SLS on airex for 30\" without LOB. Very slight extension lag with SLR. Performing 2 sets of SLR's and shuttle press. Pt reports continued HEP compliance. Continue PT per protocol, will be 5 weeks post-op on 2/2/2022.     Patient will continue to benefit from skilled PT services to modify and progress therapeutic interventions, address functional mobility deficits, address ROM deficits, address strength deficits, analyze and address soft tissue restrictions and analyze and cue movement patterns to attain remaining goals. [x]  See Plan of Care  []  See progress note/recertification  []  See Discharge Summary         Progress towards goals / Updated goals:  1. Pt will demonstrate 10 SLR without quad lag to improve knee stability for ambulation.              PN: Extension lag present. Current: Very slight extension lag with SLR but progressing. 1/31/2022  2. Pt will improve her left knee strength to 4+/5 MMT to improve stability for stair negotiation and squatting.              PN: MMT Left HS 3+/5, quad 3+/5.  3. Pt will improve her left knee AROM to 0-120 deg to improve gait mechanics.             PN: 0- deg.              DKHGACY: progressing, PROM 0-130 deg, AROM 0- deg (1/26/2022)  4. Pt will ambulate without AD for at least 150 ft with minimal to no difficulty to improve independence with mobility.             PN: progressing, 1 axillary crutch and  WBAT               [id number]: ambulates without AD, lacking terminal knee ext (1/26/2022)  5. Pt will improve her FOTO to 70, demonstrating improved functional ADL capacity.               HF: [id number], 55%.       PLAN  []  Upgrade activities as tolerated     [x]  Continue plan of care  []  Update interventions per flow sheet       []  Discharge due to:_  []  Other:_      Brittney Quintanilla, PTA 1/31/2022  11:07 AM    Future Appointments   Date Time Provider Graciela Loyd   1/31/2022 11:15 AM Humera Marcelino, PTA [facility]   2/2/2022 10:30 AM Humera Marcelino, PTA [facility]   2/7/2022 10:30 AM Humera Marcelino, PTA [facility]   2/9/2022 11:15 AM Lei Burkett, PTA [facility]   2/14/2022 11:30 AM Dariela Cleary, PT [facility]

## 2022-02-02 ENCOUNTER — HOSPITAL ENCOUNTER (OUTPATIENT)
Dept: PHYSICAL THERAPY | Age: 22
Discharge: HOME | End: 2022-02-02
Attending: PHYSICIAN ASSISTANT
Payer: COMMERCIAL

## 2022-02-02 PROCEDURE — 97112 NEUROMUSCULAR REEDUCATION: CPT

## 2022-02-02 PROCEDURE — 97530 THERAPEUTIC ACTIVITIES: CPT

## 2022-02-02 PROCEDURE — 97110 THERAPEUTIC EXERCISES: CPT

## 2022-02-02 NOTE — PROGRESS NOTES
PT DAILY TREATMENT NOTE     Patient Name: Gideon Heredia  Date:2022  : 2000  [x]  Patient  Verified  Payor: Maddie 22 / Plan: [address] / Product Type: Managed Care Medicaid /    In time:10:29  Out time:11:17  Total Treatment Time (min): 48  Visit #: 5 of 16    Treatment Area: Left knee pain [M25.562]  Status post knee surgery [Z98.890]    SUBJECTIVE  Pain Level (0-10 scale): 210  Any medication changes, allergies to medications, adverse drug reactions, diagnosis change, or new procedure performed?: [x] No    [] Yes (see summary sheet for update)  Subjective functional status/changes:   [] No changes reported  \"Knee is okay, the usual soreness. \"    OBJECTIVE    16 min Therapeutic Exercise:  [x] See flow sheet :   Rationale: increase ROM and increase strength to improve the patients ability to perform ADL's.    12 min Therapeutic Activity:  [x]  See flow sheet : Step ups forward/lateral, squats, wall sits 2x30\". Rationale: increase strength, improve coordination and increase proprioception  to improve the patients ability to perform functional activities. 15 min Neuromuscular Re-education:  [x]  See flow sheet : Left SLS on level surface, TKE, eccentric step downs, band-walks 3-way. Rationale: increase strength, improve coordination, improve balance and increase proprioception  to improve the patients ability to perform functional activities. 5 min Manual Therapy:  Left patellar mobs, DTM Left quads and ITB. Knee PROM with ext emphasis. Pt supine. The manual therapy interventions were performed at a separate and distinct time from the therapeutic activities interventions. Rationale: decrease pain, increase ROM, increase tissue extensibility and decrease trigger points to improve ease of performing functional activities and prepare for plyometrics in several weeks.      With   [x] TE   [] TA   [] neuro   [] other: Patient Education: [x] Review HEP    [] Progressed/Changed HEP based on:   [] positioning   [] body mechanics   [] transfers   [] heat/ice application    [] other:      Other Objective/Functional Measures: Increased hip 3-way to yellow t-band resistance. Added band-walks 3-way with resistance at knees. Pain Level (0-10 scale) post treatment: 3/10    ASSESSMENT/Changes in Function: Noted crepitus \"popping/clicking\" during concentric phase of leg press, pt denied pain. Will monitor symptoms. Slight increase in pain level, 3/10 post-treatment. TTP Left ITB and lateral quads. Demonstrated good form with wall sits and able to maintain for the 30\" duration. Continue PT per protocol. Patient will continue to benefit from skilled PT services to modify and progress therapeutic interventions, address functional mobility deficits, address ROM deficits, address strength deficits, analyze and address soft tissue restrictions, analyze and cue movement patterns and analyze and modify body mechanics/ergonomics to attain remaining goals. [x]  See Plan of Care  []  See progress note/recertification  []  See Discharge Summary         Progress towards goals / Updated goals:  1. Pt will demonstrate 10 SLR without quad lag to improve knee stability for ambulation.              PN: Extension lag present. Current: Very slight extension lag with SLR but progressing. 1/31/2022  2. Pt will improve her left knee strength to 4+/5 MMT to improve stability for stair negotiation and squatting.              PN: MMT Left HS 3+/5, quad 3+/5.  3. Pt will improve her left knee AROM to 0-120 deg to improve gait mechanics.             PN: 0- deg.              TFWTCFP: progressing, PROM 0-130 deg, AROM 0- deg (1/26/2022)  4. Pt will ambulate without AD for at least 150 ft with minimal to no difficulty to improve independence with mobility.               PN: progressing, 1 axillary crutch and  WBAT               WTUEHZB: ambulates without AD, lacking terminal knee ext (1/26/2022)  5. Pt will improve her FOTO to 70, demonstrating improved functional ADL capacity.               SK: Progressing, 55%.          PLAN  []  Upgrade activities as tolerated     [x]  Continue plan of care  []  Update interventions per flow sheet       []  Discharge due to:_  []  Other:_      Emeli Bright, BRITTNEY 2/2/2022  10:29 AM    Future Appointments   Date Time Provider Graciela Arellanoi   2/2/2022 10:30 AM Fariba Lai PTA [facility]   2/7/2022 10:30 AM Fariba Lai, PTA [facility]   2/9/2022 11:15 AM Jose Fulton PTA [facility]   2/14/2022 11:30 AM Jaye Reese, PT [facility]

## 2022-02-07 ENCOUNTER — HOSPITAL ENCOUNTER (OUTPATIENT)
Dept: PHYSICAL THERAPY | Age: 22
Discharge: HOME | End: 2022-02-07
Attending: PHYSICIAN ASSISTANT
Payer: COMMERCIAL

## 2022-02-07 ENCOUNTER — TELEPHONE (OUTPATIENT)
Dept: PHYSICAL THERAPY | Age: 22
End: 2022-02-07

## 2022-02-07 PROCEDURE — 97110 THERAPEUTIC EXERCISES: CPT

## 2022-02-07 PROCEDURE — 97530 THERAPEUTIC ACTIVITIES: CPT

## 2022-02-07 PROCEDURE — 97112 NEUROMUSCULAR REEDUCATION: CPT

## 2022-02-07 NOTE — PROGRESS NOTES
PT DAILY TREATMENT NOTE     Patient Name: Etienne Hansen  ISTL:[id number]  : 2000  [x]  Patient  Verified  Payor: Maddie 22 / Plan: [address] / Product Type: Managed Care Medicaid /    In time:1:45  Out time:2:38  Total Treatment Time (min): 53  Visit #: 6 of 16    Treatment Area: Left knee pain [M25.562]  Status post knee surgery [Z98.890]    SUBJECTIVE  Pain Level (0-10 scale):  2  Any medication changes, allergies to medications, adverse drug reactions, diagnosis change, or new procedure performed?: [x] No    [] Yes (see summary sheet for update)  Subjective functional status/changes:   [] No changes reported  \"I really haven't had much pain, I think it gets better every day\"    OBJECTIVE    Modality rationale: decrease pain to improve the patients ability to perform daily tasks   Min Type Additional Details    [] Estim:  []Unatt       []IFC  []Premod                        []Other:  []w/ice   []w/heat  Position:  Location:    [] Estim: []Att    []TENS instruct  []NMES                    []Other:  []w/US   []w/ice   []w/heat  Position:  Location:    []  Traction: [] Cervical       []Lumbar                       [] Prone          []Supine                       []Intermittent   []Continuous Lbs:  [] before manual  [] after manual    []  Ultrasound: []Continuous   [] Pulsed                           []1MHz   []3MHz W/cm2:  Location:    []  Iontophoresis with dexamethasone         Location: [] Take home patch   [] In clinic    []  Ice     []  heat  []  Ice massage  []  Laser   []  Anodyne Position:  Location:    []  Laser with stim  []  Other:  Position:  Location:   10 [x]  Vasopneumatic Device    []  Right     [x]  Left  Pre-treatment girth:  Post-treatment girth:  Measured at (location):  Pressure:       [x] lo [] med [] hi   Temperature: [x] lo [] med [] hi   [] Skin assessment post-treatment:  []intact []redness- no adverse reaction    []redness  adverse reaction: 13 min Therapeutic Exercise:  [x] See flow sheet :   Rationale: increase ROM and increase strength to improve the patients ability to perform ADLs    10 min Therapeutic Activity:  [x]  See flow sheet :   Rationale: increase strength, improve coordination, improve balance and increase proprioception  to improve the patients ability to increase ease with daily tasks     15 min Neuromuscular Re-education:  [x]  See flow sheet :   Rationale: increase strength, improve coordination, improve balance and increase proprioception  to improve the patients ability to perform functional tasks    5 min Manual Therapy:  Left patella mobs, therastick to HS and gastroc, DTM to popliteal area, knee ext mobs and overpressure. The manual therapy interventions were performed at a separate and distinct time from the therapeutic activities interventions. Rationale: decrease pain, increase ROM and increase tissue extensibility to improve functional mobility         With   [] TE   [] TA   [] neuro   [] other: Patient Education: [x] Review HEP    [] Progressed/Changed HEP based on:   [] positioning   [] body mechanics   [] transfers   [] heat/ice application    [] other:      Other Objective/Functional Measures:   SLS up to 13 sec before requiring HR      Pain Level (0-10 scale) post treatment: 2    ASSESSMENT/Changes in Function: Pt is challenged well with SL stability, requires occasional UE support with SL balance on foam. TTP @ medial HS. Able to achieve 0* knee ext with overpressure. No c/o \"popping\" with leg press today.     Patient will continue to benefit from skilled PT services to modify and progress therapeutic interventions, address functional mobility deficits, address ROM deficits, address strength deficits, analyze and address soft tissue restrictions, analyze and cue movement patterns, analyze and modify body mechanics/ergonomics, assess and modify postural abnormalities and address imbalance/dizziness to attain remaining goals. []  See Plan of Care  []  See progress note/recertification  []  See Discharge Summary         Progress towards goals / Updated goals:  1. Pt will demonstrate 10 SLR without quad lag to improve knee stability for ambulation.              PN: Extension lag present.              Current: Very slight extension lag with SLR but progressing. 1/31/2022  2. Pt will improve her left knee strength to 4+/5 MMT to improve stability for stair negotiation and squatting.              PN: MMT Left HS 3+/5, quad 3+/5.  3. Pt will improve her left knee AROM to 0-120 deg to improve gait mechanics.             PN: 0- deg.              UQLRVMK: progressing, PROM 0-130 deg, AROM 0- deg (1/26/2022)  4. Pt will ambulate without AD for at least 150 ft with minimal to no difficulty to improve independence with mobility.             PN: progressing, 1 axillary crutch and  WBAT               YIBYMMV: ambulates without AD, lacking terminal knee ext (1/26/2022)  5. Pt will improve her FOTO to 70, demonstrating improved functional ADL capacity.               FI: [id number], 55%.       PLAN  []  Upgrade activities as tolerated     [x]  Continue plan of care  []  Update interventions per flow sheet       []  Discharge due to:_  []  Other:_      Yon Lara PTA 2/7/2022  1:46 PM    Future Appointments   Date Time Provider Graciela Loyd   2/9/2022 11:15 AM Deborah Sargent, PTA [facility]   2/14/2022 11:30 AM [facility]

## 2022-02-09 ENCOUNTER — HOSPITAL ENCOUNTER (OUTPATIENT)
Dept: PHYSICAL THERAPY | Age: 22
Discharge: HOME | End: 2022-02-09
Attending: PHYSICIAN ASSISTANT
Payer: COMMERCIAL

## 2022-02-09 PROCEDURE — 97110 THERAPEUTIC EXERCISES: CPT

## 2022-02-09 PROCEDURE — 97530 THERAPEUTIC ACTIVITIES: CPT

## 2022-02-09 PROCEDURE — 97112 NEUROMUSCULAR REEDUCATION: CPT

## 2022-02-09 NOTE — PROGRESS NOTES
PT DAILY TREATMENT NOTE     Patient Name: Armin Em  Date:2022  : 2000  [x]  Patient  Verified  Payor: Maddie 22 / Plan: [address] / Product Type: Managed Care Medicaid /    In time:11:16  Out time:12:18  Total Treatment Time (min): 62  Visit #: 7 of 16    Treatment Area: Left knee pain [M25.562]  Status post knee surgery [Z98.890]    SUBJECTIVE  Pain Level (0-10 scale): 1  Any medication changes, allergies to medications, adverse drug reactions, diagnosis change, or new procedure performed?: [x] No    [] Yes (see summary sheet for update)  Subjective functional status/changes:   [] No changes reported  Pt reports she feels like her knee is getting better everyday. Pt states she has an appointment next week to get her brace changed out to a sports brace. OBJECTIVE    Modality rationale: decrease inflammation and decrease pain to improve the patients ability to tolerate post workout soreness.    Min Type Additional Details    [] Estim:  []Unatt       []IFC  []Premod                        []Other:  []w/ice   []w/heat  Position:  Location:    [] Estim: []Att    []TENS instruct  []NMES                    []Other:  []w/US   []w/ice   []w/heat  Position:  Location:    []  Traction: [] Cervical       []Lumbar                       [] Prone          []Supine                       []Intermittent   []Continuous Lbs:  [] before manual  [] after manual    []  Ultrasound: []Continuous   [] Pulsed                           []1MHz   []3MHz W/cm2:  Location:    []  Iontophoresis with dexamethasone         Location: [] Take home patch   [] In clinic   10 [x]  Ice     []  heat  []  Ice massage  []  Laser   []  Anodyne Position:reclined  Location: left knee    []  Laser with stim  []  Other:  Position:  Location:    []  Vasopneumatic Device    []  Right     []  Left  Pre-treatment girth:  Post-treatment girth:  Measured at (location):  Pressure:       [] lo [] med [] hi Temperature: [] lo [] med [] hi   [] Skin assessment post-treatment:  []intact []redness- no adverse reaction    []redness  adverse reaction:         21 min Therapeutic Exercise:  [x] See flow sheet :   Rationale: increase ROM and increase strength to improve the patients ability to perform functional task with ease. 10 min Therapeutic Activity:  [x]  See flow sheet :   Rationale: increase strength and improve coordination  to improve the patients ability to perform daily task with ease. 15 min Neuromuscular Re-education:  [x]  See flow sheet :   Rationale: increase strength, improve coordination, improve balance and increase proprioception  to improve the patients ability to perform ADL's with ease. 6 min Manual Therapy:  Left patella mobs, therastick to HS and gastroc, DTM to popliteal area, knee ext mobs and overpressure. The manual therapy interventions were performed at a separate and distinct time from the therapeutic activities interventions. Rationale: decrease pain, increase ROM and increase tissue extensibility to improve functional mobility with ambulation             With   [] TE   [] TA   [] neuro   [] other: Patient Education: [x] Review HEP    [] Progressed/Changed HEP based on:   [] positioning   [] body mechanics   [] transfers   [] heat/ice application    [] other:      Other Objective/Functional Measures:      Pain Level (0-10 scale) post treatment: 2    ASSESSMENT/Changes in Function:   Minor offloading on the left LE noted with squats with pt displaying improved form with correction and good carryover. airex added to squats to progress pt and aid with improved stability in the left knee. Minor valgus of the right knee noted with step downs and wall sits. Improved with pt cued to push left knee out. Pt tolerated progressions to therex well and notes a minor increase in soreness post treatment.      Patient will continue to benefit from skilled PT services to modify and progress therapeutic interventions, address functional mobility deficits, address ROM deficits, address strength deficits, analyze and address soft tissue restrictions, analyze and cue movement patterns, analyze and modify body mechanics/ergonomics and assess and modify postural abnormalities to attain remaining goals. [x]  See Plan of Care  []  See progress note/recertification  []  See Discharge Summary         Progress towards goals / Updated goals:  1. Pt will demonstrate 10 SLR without quad lag to improve knee stability for ambulation.              PN: Extension lag present.              Current: Very slight extension lag with SLR but progressing. 1/31/2022  2. Pt will improve her left knee strength to 4+/5 MMT to improve stability for stair negotiation and squatting.              PN: MMT Left HS 3+/5, quad 3+/5. Current: progressing 2/9/22 left HS 4-/5, left quad 4+/5  3. Pt will improve her left knee AROM to 0-120 deg to improve gait mechanics.             PN: 0- deg.              [id number]: progressing, PROM 0-130 deg, AROM 0- deg (1/26/2022)  4. Pt will ambulate without AD for at least 150 ft with minimal to no difficulty to improve independence with mobility.             PN: progressing, 1 axillary crutch and  WBAT               BSRGBQD: ambulates without AD, lacking terminal knee ext (1/26/2022)  5. Pt will improve her FOTO to 70, demonstrating improved functional ADL capacity.               JE: [id number], 55%.       PLAN  []  Upgrade activities as tolerated     [x]  Continue plan of care  []  Update interventions per flow sheet       []  Discharge due to:_  []  Other:_      Ella Quintanilla, BRITTNEY 2/9/2022  11:23 AM    Future Appointments   Date Time Provider Graciela Loyd   2/14/2022 11:30 AM Raquel Julio, PT [facility]

## 2022-02-14 ENCOUNTER — HOSPITAL ENCOUNTER (OUTPATIENT)
Dept: PHYSICAL THERAPY | Age: 22
Discharge: HOME | End: 2022-02-14
Attending: PHYSICIAN ASSISTANT
Payer: COMMERCIAL

## 2022-02-14 PROCEDURE — 97110 THERAPEUTIC EXERCISES: CPT

## 2022-02-14 PROCEDURE — 97112 NEUROMUSCULAR REEDUCATION: CPT

## 2022-02-14 PROCEDURE — 97530 THERAPEUTIC ACTIVITIES: CPT

## 2022-02-14 NOTE — PROGRESS NOTES
PT DAILY TREATMENT NOTE     Patient Name: Alexa Lowe  Date:2022  : 2000  [x]  Patient  Verified  Payor: Maddie 22 / Plan: [address] / Product Type: Managed Care Medicaid /    In time:1130am  Out time:1213pm  Total Treatment Time (min): 43  Visit #: 7 of 16     Treatment Area: Left knee pain [M25.562]  Status post knee surgery [Z98.890]    SUBJECTIVE  Pain Level (0-10 scale): 1  Any medication changes, allergies to medications, adverse drug reactions, diagnosis change, or new procedure performed?: [x] No    [] Yes (see summary sheet for update)  Subjective functional status/changes:   [] No changes reported  \"the only thing I still notice is when I walk I can feel a slight popping or clicking sensation, but not bad\" specified at top of the knee. OBJECTIVE           21 min Therapeutic Exercise:  [x]? ? See flow sheet :   Rationale: increase ROM and increase strength to improve the patients ability to manage ADL's.     10 min Therapeutic Activity:  [x]? ?  See flow sheet : Step ups forward/lateral, squats. Rationale: increase strength, improve coordination and increase proprioception  to improve the patients ability to manage functional activities with greater ease. .     8 min Neuromuscular Re-education:  [x]? ?  See flow sheet : Left SLS on level surface, TKE, band walks   Rationale: increase strength, improve coordination, improve balance and increase proprioception  to improve the patients ability to perform functional activities.    4 min min Manual Therapy:  pt prone -- STM with therastick to the HS and gastroc concurrently with prone hang stretch   The manual therapy interventions were performed at a separate and distinct time from the therapeutic activities interventions. Rationale: decrease pain, increase ROM, increase tissue extensibility and decrease trigger points to improve heel strike during gait.     With   [] TE   [] TA   [] neuro   [] other: Patient Education: [x] Review HEP    [] Progressed/Changed HEP based on:   [] positioning   [] body mechanics   [] transfers   [] heat/ice application    [] other:      Other Objective/Functional Measures: see progress towards goals     Pain Level (0-10 scale) post treatment: 2    ASSESSMENT/Changes in Function: Pt demonstrates excellent progression towards meeting ROM goals and no longer demonstrates quad lag with SLR in supine. TKE in standing and with ROM has been improving as well. Very slight tightness remains. Will continue to benefit from PT for stabilization work and to reduce patellar tendon discomfort with ambulation. Patient will continue to benefit from skilled PT services to modify and progress therapeutic interventions, address functional mobility deficits, address ROM deficits, address strength deficits, analyze and address soft tissue restrictions, analyze and cue movement patterns, analyze and modify body mechanics/ergonomics, assess and modify postural abnormalities, address imbalance/dizziness and instruct in home and community integration to attain remaining goals. []  See Plan of Care  []  See progress note/recertification  []  See Discharge Summary         Progress towards goals / Updated goals:  1. Pt will demonstrate 10 SLR without quad lag to improve knee stability for ambulation.              PN: Extension lag present. Current: met (2/14/2022)  2. Pt will improve her left knee strength to 4+/5 MMT to improve stability for stair negotiation and squatting.              PN: MMT Left HS 3+/5, quad 3+/5. Current: progressing 2/9/22 left HS 4-/5, left quad 4+/5  3. Pt will improve her left knee AROM to 0-120 deg to improve gait mechanics.             HA: 0- deg. Current: progressing, 0-2-140 deg (2/14/2022)  4. Pt will ambulate without AD for at least 150 ft with minimal to no difficulty to improve independence with mobility.               UQ: progressing, 1 axillary crutch and  WBAT               Current: ambulates without AD, lacking terminal knee ext (1/26/2022)  5. Pt will improve her FOTO to 70, demonstrating improved functional ADL capacity.             SN: SEOGAVHAMSLOTTIE, 55%.      PLAN  []  Upgrade activities as tolerated     [x]  Continue plan of care  []  Update interventions per flow sheet       []  Discharge due to:_  []  Other:_      Karlene Wilkins, PT 2/14/2022  11:53 AM    No future appointments.

## 2022-02-16 ENCOUNTER — HOSPITAL ENCOUNTER (OUTPATIENT)
Dept: PHYSICAL THERAPY | Age: 22
Discharge: HOME | End: 2022-02-16
Attending: PHYSICIAN ASSISTANT
Payer: COMMERCIAL

## 2022-02-16 ENCOUNTER — OFFICE VISIT (OUTPATIENT)
Dept: ORTHOPEDIC SURGERY | Age: 22
End: 2022-02-16
Payer: COMMERCIAL

## 2022-02-16 VITALS — BODY MASS INDEX: 22.15 KG/M2 | TEMPERATURE: 97.8 F | WEIGHT: 125 LBS | HEIGHT: 63 IN

## 2022-02-16 DIAGNOSIS — S83.512A RUPTURE OF ANTERIOR CRUCIATE LIGAMENT OF LEFT KNEE, INITIAL ENCOUNTER: Primary | ICD-10-CM

## 2022-02-16 PROCEDURE — 99024 POSTOP FOLLOW-UP VISIT: CPT | Performed by: PHYSICIAN ASSISTANT

## 2022-02-16 PROCEDURE — 97530 THERAPEUTIC ACTIVITIES: CPT

## 2022-02-16 PROCEDURE — 97110 THERAPEUTIC EXERCISES: CPT

## 2022-02-16 PROCEDURE — 97112 NEUROMUSCULAR REEDUCATION: CPT

## 2022-02-16 NOTE — PROGRESS NOTES
PT DAILY TREATMENT NOTE     Patient Name: Frances Spine  Date:2022  : 2000  [x]  Patient  Verified  Payor: Maddie 22 / Plan: [address] / Product Type: Managed Care Medicaid /    In time:11:57  Out time:12:47  Total Treatment Time (min): 50  Visit #: 8 of 16    Treatment Area: Left knee pain [M25.562]  Status post knee surgery [Z98.890]    SUBJECTIVE  Pain Level (0-10 scale): 1/10  Any medication changes, allergies to medications, adverse drug reactions, diagnosis change, or new procedure performed?: [x] No    [] Yes (see summary sheet for update)  Subjective functional status/changes:   [] No changes reported  \"Occasionally have clicking around the knee cap when I'm up walking around. \"    OBJECTIVE    22 min Therapeutic Exercise:  [x] See flow sheet :    Rationale: increase ROM and increase strength to improve the patients ability to perform ADL's.    8 min Therapeutic Activity:  [x]  See flow sheet : Dynamic step ups forward/lateral, wall squats. Rationale: increase strength, improve coordination and increase proprioception  to improve the patients ability to be begin plyometrics in a few weeks. 15 min Neuromuscular Re-education:  [x]  See flow sheet : Eccentric step downs, TKE, SLS with ball toss on level surface, band-walks. Rationale: increase strength, improve balance and increase proprioception  to improve the patients ability to perform functional activities. 5 min Manual Therapy:  Left patellar mobs. STM/DTM Left quads, cross friction massage to patellar tendon. PROM left knee end range flexion and ext. Pt supine. The manual therapy interventions were performed at a separate and distinct time from the therapeutic activities interventions. Rationale: decrease pain, increase ROM, increase tissue extensibility and decrease trigger points to improve ease of performing daily tasks and for return to plyometric activities in a few weeks.     With [x] TE   [] TA   [] neuro   [] other: Patient Education: [x] Review HEP    [] Progressed/Changed HEP based on:   [] positioning   [] body mechanics   [] transfers   [] heat/ice application    [] other:      Other Objective/Functional Measures: Added Left SLS green ball toss 15x. Changed step ups to dynamic. Pain Level (0-10 scale) post treatment: 2/10    ASSESSMENT/Changes in Function: Pt is progressing well towards goals. Pt is 7 weeks post-op today, ambulating with unlocked knee brace. Has not been fitted for a sports brace. Occasional \"clicking\" inferior of Left patella with shuttle press, squats and at times while walking per pt. STM/DTM to quads and cross friction massage to patellar tendon to decrease tension. Continue PT per protocol. Patient will continue to benefit from skilled PT services to modify and progress therapeutic interventions, address functional mobility deficits, address ROM deficits, address strength deficits, analyze and address soft tissue restrictions, analyze and cue movement patterns and analyze and modify body mechanics/ergonomics to attain remaining goals. [x]  See Plan of Care  []  See progress note/recertification  []  See Discharge Summary         Progress towards goals / Updated goals:  1. Pt will demonstrate 10 SLR without quad lag to improve knee stability for ambulation.              PN: Extension lag present. Current: met (2/14/2022)  2. Pt will improve her left knee strength to 4+/5 MMT to improve stability for stair negotiation and squatting.              PN: MMT Left HS 3+/5, quad 3+/5.               Current: progressing 2/9/22 left HS 4-/5, left quad 4+/5  3. Pt will improve her left knee AROM to 0-120 deg to improve gait mechanics.             RC: 0- deg. Current: progressing, 0-2-140 deg (2/14/2022)  4.  Pt will ambulate without AD for at least 150 ft with minimal to no difficulty to improve independence with mobility.             PN: progressing, 1 axillary crutch and  WBAT               WEVZADW: ambulates without AD, lacking terminal knee ext (1/26/2022)  5. Pt will improve her FOTO to 70, demonstrating improved functional ADL capacity.               VJ: NVKENHOXIVR, 55%.      PLAN  []  Upgrade activities as tolerated     [x]  Continue plan of care  []  Update interventions per flow sheet       []  Discharge due to:_  []  Other:_      Atul Jacobsen, PTA 2/16/2022  12:11 PM    Future Appointments   Date Time Provider Graciela Loyd   2/16/2022  2:00 PM Sumeet Pedraza [facility]   2/21/2022 12:45 PM Milderd Mode, PTA [facility]   2/23/2022 10:30 AM Bonilla Mckeon, PTA [facility]   2/28/2022 12:00 PM Milderd Mode, PTA [facility]   3/2/2022 12:00 PM Yazidism Six, PT [facility]   3/7/2022 11:15 AM Milderd Mode, PTA [facility]

## 2022-02-21 ENCOUNTER — HOSPITAL ENCOUNTER (OUTPATIENT)
Dept: PHYSICAL THERAPY | Age: 22
Discharge: HOME | End: 2022-02-21
Attending: PHYSICIAN ASSISTANT
Payer: COMMERCIAL

## 2022-02-21 PROCEDURE — 97112 NEUROMUSCULAR REEDUCATION: CPT

## 2022-02-21 PROCEDURE — 97530 THERAPEUTIC ACTIVITIES: CPT

## 2022-02-21 PROCEDURE — 97110 THERAPEUTIC EXERCISES: CPT

## 2022-02-21 NOTE — PROGRESS NOTES
[address]  [address]  [place], [address]  [phone] fax    Physical Therapy Progress Note  Patient name: Woo Leiva Start of Care: 2021   Referral source: Sumeet Marin : 2000   Medical/Treatment Diagnosis: Left knee pain [M25.562]  Status post knee surgery [Z98.890]  Payor: [employer] / Plan: [email or web address] / Product Type: Managed Care Medicaid /  Onset Date:2021 DOS     Prior Hospitalization: see medical history Provider#: [id number]   Medications: Verified on Patient Summary List    Comorbidities: meniscus tear 1.5 years ago, allergies  Prior Level of Function:pt was running regularly, performing barbell squats, regular gym attendance without knee pain  Visits from Start of Care: 20    Missed Visits: 0      Established Goals:        Excellent         Good         Limited            None  [x] Increased ROM   [x]  []  []  []  [x] Increased Strength  []  [x]  []  []  [x] Increased Mobility  [x]  []  []  []   [x] Decreased Pain   []  [x]  []  []    Key Functional Changes:     Progress towards goals / Updated goals:  1. Pt will demonstrate 10 SLR without quad lag to improve knee stability for ambulation.              PN: Extension lag present.              Current: met. 2. Pt will improve her left knee strength to 4+/5 MMT to improve stability for stair negotiation and squatting.              PN: MMT Left HS 3+/5, quad 3+/5.               Current: progressing - left HS 4-/5, left quad 4+/5  3. Pt will improve her left knee AROM to 0-120 deg to improve gait mechanics.             JJ: 0- deg.              RMEGWXE: progressing, 0-2-140 deg. 4. Pt will ambulate without AD for at least 150 ft with minimal to no difficulty to improve independence with mobility.             PN: progressing, 1 axillary crutch and  WBAT               IITOSSL: ambulates without AD, lacking terminal knee ext.   5. Pt will improve her FOTO to 70, demonstrating improved functional ADL capacity.             PN: Progressing, 55%.               Current: Progressing, 63%. Updated Goals: to be achieved in 4 weeks:  1. Pt will improve her left knee strength to 4+/5 MMT to improve stability for stair negotiation and squatting.              PN: progressing - left HS 4-/5, left quad 4+/5  2. Pt will improve her left knee AROM to 0-120 deg to improve gait mechanics.             PF: progressing, 0-2-140 deg. 3. Pt will improve her FOTO to 70, demonstrating improved functional ADL capacity.             MB: [id number], 63%. 4. Pt will demonstrate proper mechanics/form with jogging and light jumps to improve pt's ability to return to recreational activities. PN: Introduced shuttle press plyometric jumps. ASSESSMENT/RECOMMENDATIONS:  Pt is almost 8 weeks post-op and is progressing well. Currently does not have a sports brace. Introduced shuttle press plyo jumps. Reviewed protocol and educated the progression of exercises. Plan to continue PT for additional 1-2x a week for 4 weeks to progress towards running and plyometrics.   [x]Continue therapy per initial plan/protocol at a frequency of  1-2 x per week for 4 weeks  []Continue therapy with the following recommended changes:_____________________      _____________________________________________________________________  []Discontinue therapy progressing towards or have reached established goals  []Discontinue therapy due to lack of appreciable progress towards goals  []Discontinue therapy due to lack of attendance or compliance  []Await Physician's recommendations/decisions regarding therapy  []Other:________________________________________________________________    Thank you for this referral.    Emani Orantes, BRITTNEY 2/21/2022 1:53 PM  Reggie Aguilar PT, DPT  2/21/2022  2:56 PM  NOTE TO PHYSICIAN:  PLEASE COMPLETE THE ORDERS BELOW AND   [facility]: [phone]  If you are unable to process this request in 24 hours please contact our office: [phone]    ? I have read the above report and request that my patient continue as recommended. ? I have read the above report and request that my patient continue therapy with the following changes/special instructions:__________________________________________________________  ? I have read the above report and request that my patient be discharged from therapy.     Physicians signature: ______________________________Date: ______Time:______     STACEY Carpio

## 2022-02-21 NOTE — PROGRESS NOTES
PT DAILY TREATMENT NOTE     Patient Name: Carl Fitzgerald  Date:2022  : 2000  [x]  Patient  Verified  Payor: Maddie Sanchez / Plan: [address] / Product Type: Managed Care Medicaid /    In time:12:45  Out time:1:31  Total Treatment Time (min): 55  Visit #: 9 of 16    Treatment Area: Left knee pain [M25.562]  Status post knee surgery [Z98.890]    SUBJECTIVE  Pain Level (0-10 scale): 10  Any medication changes, allergies to medications, adverse drug reactions, diagnosis change, or new procedure performed?: [x] No    [] Yes (see summary sheet for update)  Subjective functional status/changes:   [] No changes reported  No new complaints. OBJECTIVE     19 min Therapeutic Exercise:  [x] See flow sheet :   Rationale: increase ROM and increase strength to improve the patients ability to perform ADL's.     12 min Therapeutic Activity:  [x]  See flow sheet : RDL's weight squats, dynamic step ups, wall squats. Rationale: increase strength, improve coordination and increase proprioception  to improve the patients ability to perform functional activities. 10 min Neuromuscular Re-education:  [x]  See flow sheet : Plyo jumps on shuttle press, TKE, band-walks. Rationale: increase strength, improve coordination, improve balance and increase proprioception  to improve the patients ability to return to recreational activities. 5 min Manual Therapy:  Left patellar mobs, STM/DTM Left quads, ITB and gastroc. Pt supine. The manual therapy interventions were performed at a separate and distinct time from the therapeutic activities interventions. Rationale: decrease pain, increase ROM, increase tissue extensibility and decrease trigger points to improve ease of performing functional activities.     With   [x] TE   [] TA   [] neuro   [] other: Patient Education: [x] Review HEP    [] Progressed/Changed HEP based on:   [] positioning   [] body mechanics   [] transfers   [] heat/ice application    [] other:      Other Objective/Functional Measures: FOTO 63%. Tolerated new exercises well, added 20# KB RDL and squat, added shuttle press plyometric jumps with 2 bands of resistance. Pt is almost 8 weeks post-op and is progressing well. Introduced shuttle press plyo jumps. Reviewed protocol and educated the progression of exercises. Plan to continue PT for additional 1-2x a week for 4 weeks to progress towards running and plyometrics. Pain Level (0-10 scale) post treatment: 2/10    ASSESSMENT/Changes in Function:      []  See Plan of Care  [x]  See progress note/recertification  []  See Discharge Summary         Progress towards goals / Updated goals:  1. Pt will demonstrate 10 SLR without quad lag to improve knee stability for ambulation.              PN: Extension lag present.              Current: met (2/14/2022)  2. Pt will improve her left knee strength to 4+/5 MMT to improve stability for stair negotiation and squatting.              PN: MMT Left HS 3+/5, quad 3+/5.               Current: progressing 2/9/22 left HS 4-/5, left quad 4+/5  3. Pt will improve her left knee AROM to 0-120 deg to improve gait mechanics.             QN: 0- deg.              VBERXLY: progressing, 0-2-140 deg (2/14/2022)  4. Pt will ambulate without AD for at least 150 ft with minimal to no difficulty to improve independence with mobility.             PN: progressing, 1 axillary crutch and  WBAT               BUIJGDI: ambulates without AD, lacking terminal knee ext (1/26/2022)  5. Pt will improve her FOTO to 70, demonstrating improved functional ADL capacity.               UO: Progressing, 55%.    Current: Progressing, 63%. 2/21/2022     PLAN  []  Upgrade activities as tolerated     [x]  Continue plan of care  []  Update interventions per flow sheet       []  Discharge due to:_  []  Other:_      Susanne Oro, BRITTNEY 2/21/2022  12:46 PM    Future Appointments   Date Time Provider Department [place]   2/23/2022 10:30 AM Elana Cruz, PTA [facility]   2/28/2022 12:00 PM Olayinka Shade, PTA [facility]   3/2/2022 12:00 PM Juarez Lang, PT [facility]   3/7/2022 11:15 AM Olayinka Shade, PTA [facility]

## 2022-02-23 ENCOUNTER — HOSPITAL ENCOUNTER (OUTPATIENT)
Dept: PHYSICAL THERAPY | Age: 22
Discharge: HOME | End: 2022-02-23
Attending: PHYSICIAN ASSISTANT
Payer: COMMERCIAL

## 2022-02-23 PROCEDURE — 97530 THERAPEUTIC ACTIVITIES: CPT

## 2022-02-23 PROCEDURE — 97110 THERAPEUTIC EXERCISES: CPT

## 2022-02-23 PROCEDURE — 97112 NEUROMUSCULAR REEDUCATION: CPT

## 2022-02-28 ENCOUNTER — HOSPITAL ENCOUNTER (OUTPATIENT)
Dept: PHYSICAL THERAPY | Age: 22
Discharge: HOME | End: 2022-02-28
Attending: PHYSICIAN ASSISTANT
Payer: COMMERCIAL

## 2022-02-28 PROCEDURE — 97110 THERAPEUTIC EXERCISES: CPT

## 2022-02-28 PROCEDURE — 97112 NEUROMUSCULAR REEDUCATION: CPT

## 2022-02-28 PROCEDURE — 97530 THERAPEUTIC ACTIVITIES: CPT

## 2022-02-28 NOTE — PROGRESS NOTES
PT DAILY TREATMENT NOTE     Patient Name: Hebert Bartholomew  Date:2022  : 2000  [x]  Patient  Verified  Payor: Maddie 22 / Plan: [address] / Product Type: Managed Care Medicaid /    In time:12:00  Out time:12:48  Total Treatment Time (min): 48  Visit #: 2 of 4-8    Treatment Area: Left knee pain [M25.562]  Status post knee surgery [Z98.890]    SUBJECTIVE  Pain Level (0-10 scale): 1/10  Any medication changes, allergies to medications, adverse drug reactions, diagnosis change, or new procedure performed?: [x] No    [] Yes (see summary sheet for update)  Subjective functional status/changes:   [] No changes reported  \"Knee is good. Did the deadlifts and I have mm soreness in both legs. \"    OBJECTIVE    19 min Therapeutic Exercise:  [x] See flow sheet :   Rationale: increase ROM and increase strength to improve the patients ability to perform ADL's.    12 min Therapeutic Activity:  [x]  See flow sheet : RDL's weight squats, dynamic step ups, wall squats. Rationale: increase strength and improve coordination  to improve the patients ability to perform functional activities. 12 min Neuromuscular Re-education:  [x]  See flow sheet : Plyo jumps on shuttle press, TKE, band-walks, Left SL sit to stand from elevated surface. Rationale: increase strength, improve coordination, improve balance and increase proprioception  to improve the patients ability to perform functional activities. 5 min Manual Therapy:  Stick and DTM to Left HS with pt in prone. The manual therapy interventions were performed at a separate and distinct time from the therapeutic activities interventions. Rationale: decrease pain, increase ROM, increase tissue extensibility and decrease trigger points to improve ease of performing functional activities.        With   [x] TE   [] TA   [] neuro   [] other: Patient Education: [x] Review HEP    [] Progressed/Changed HEP based on:   [] positioning [] body mechanics   [] transfers   [] heat/ice application    [] other:      Other Objective/Functional Measures: AROM Left knee 1-145 degrees. Added airex to hip 3-way to challenge Left knee stability. Added 1/2 foam to standing gastroc stretch. Pain Level (0-10 scale) post treatment: 0-1/10    ASSESSMENT/Changes in Function: Pt demonstrates increased strength evident from performing deadlifts and squats with 25# KB. Performed Left SL sit to stand from elevated surface 10 reps. Added prone ext hand for 3' as pt ext ROM has improved however has not met full ext. Continue PT per protocol, waiting for sports brace to initiate next phase. Patient will continue to benefit from skilled PT services to modify and progress therapeutic interventions, address functional mobility deficits, address ROM deficits, address strength deficits, analyze and address soft tissue restrictions, analyze and cue movement patterns and analyze and modify body mechanics/ergonomics to attain remaining goals. [x]  See Plan of Care  []  See progress note/recertification  []  See Discharge Summary         Progress towards goals / Updated goals:  Updated Goals: to be achieved in 4 weeks:   1. Pt will improve her left knee strength to 4+/5 MMT to improve stability for stair negotiation and squatting.              PN: progressing - left HS 4-/5, left quad 4+/5  2. Pt will improve her left knee AROM to 0-120 deg to improve gait mechanics.             QM: progressing, 0-2-140 deg. Current: AROM Left knee 1-145 degrees. 2/28/2022  3. Pt will improve her FOTO to 70, demonstrating improved functional ADL capacity.             NC: [id number], 63%. 4. Pt will demonstrate proper mechanics/form with jogging and light jumps to improve pt's ability to return to recreational activities.             VH: Introduced shuttle press plyometric jumps.     PLAN  []  Upgrade activities as tolerated     [x]  Continue plan of care  []  Update interventions per flow sheet       []  Discharge due to:_  []  Other:_      Oly Frederick, PTA 2/28/2022  11:57 AM    Future Appointments   Date Time Provider Graciela Loyd   2/28/2022 12:00 PM Dulce Maria Cosme, PTA [facility]   3/2/2022 12:00 PM Abraham Gregg, PT [facility]   3/7/2022 11:15 AM Caitlin Merida, PTA [facility]

## 2022-03-02 ENCOUNTER — APPOINTMENT (OUTPATIENT)
Dept: PHYSICAL THERAPY | Age: 22
End: 2022-03-02
Attending: PHYSICIAN ASSISTANT
Payer: COMMERCIAL

## 2022-03-07 ENCOUNTER — HOSPITAL ENCOUNTER (OUTPATIENT)
Dept: PHYSICAL THERAPY | Age: 22
Discharge: HOME | End: 2022-03-07
Attending: PHYSICIAN ASSISTANT
Payer: COMMERCIAL

## 2022-03-07 PROCEDURE — 97110 THERAPEUTIC EXERCISES: CPT

## 2022-03-07 PROCEDURE — 97112 NEUROMUSCULAR REEDUCATION: CPT

## 2022-03-07 PROCEDURE — 97530 THERAPEUTIC ACTIVITIES: CPT

## 2022-03-07 NOTE — PROGRESS NOTES
PT DAILY TREATMENT NOTE     Patient Name: Connor Estrada  Date:3/7/2022  : 2000  [x]  Patient  Verified  Payor: Maddie Sanchez / Plan: [address] / Product Type: Managed Care Medicaid /    In time:11:12  Out time:12:04  Total Treatment Time (min): 46  Visit #: 3 of 4-8    Treatment Area: Left knee pain [M25.562]  Status post knee surgery [Z98.890]    SUBJECTIVE  Pain Level (0-10 scale): 2/10  Any medication changes, allergies to medications, adverse drug reactions, diagnosis change, or new procedure performed?: [x] No    [] Yes (see summary sheet for update)  Subjective functional status/changes:   [] No changes reported  \"I don't know why but it's been hurting more than usual when I straighten my knee all the way. \"    OBJECTIVE    22 min Therapeutic Exercise:  [x] See flow sheet :   Rationale: increase ROM and increase strength to improve the patients ability to perform ADL's.    12 min Therapeutic Activity:  [x]  See flow sheet : RDL's weight squats, dynamic step ups, wall squats. Rationale: increase strength, improve coordination and increase proprioception  to improve the patients ability to perform functional activities. 12 min Neuromuscular Re-education:  [x]  See flow sheet : Plyo jumps on shuttle press, TKE, band-walks, Left SL sit to stand from elevated surface. Rationale: increase strength, improve coordination, improve balance and increase proprioception  to improve the patients ability to perform functional activities. 6 min Manual Therapy:  Left patellar mobs, STM/DTM Left quads and ITB. PROM end range flexion. The manual therapy interventions were performed at a separate and distinct time from the therapeutic activities interventions. Rationale: decrease pain, increase tissue extensibility and decrease trigger points to improve ease of performing functional activities.           With   [x] TE   [] TA   [] neuro   [] other: Patient Education: [x] Review HEP    [] Progressed/Changed HEP based on:   [] positioning   [] body mechanics   [] transfers   [] heat/ice application    [] other:      Other Objective/Functional Measures: MMT Left knee quad 4+/5, HS 4+/5. Added lunges 10 reps each. Pain Level (0-10 scale) post treatment: 0/10    ASSESSMENT/Changes in Function: Pt is progressing well, demonstrating improved strength and stability. Unable to progress with plyometrics as pt has not received a sports brace at this time. Denied pain with exercises. Continue PT per protocol. Patient will continue to benefit from skilled PT services to modify and progress therapeutic interventions, address functional mobility deficits, address ROM deficits, address strength deficits, analyze and address soft tissue restrictions, analyze and cue movement patterns and analyze and modify body mechanics/ergonomics to attain remaining goals. [x]  See Plan of Care  []  See progress note/recertification  []  See Discharge Summary         Progress towards goals / Updated goals:  Updated Goals: to be achieved in 4 weeks:   1. Pt will improve her left knee strength to 4+/5 MMT to improve stability for stair negotiation and squatting.              PN: progressing - left HS 4-/5, left quad 4+/5   Current:  MMT Left knee quad 4+/5, HS 4+/5. 3/7/2022  2. Pt will improve her left knee AROM to 0-120 deg to improve gait mechanics.             CE: progressing, 0-2-140 deg. Current: AROM Left knee 1-145 degrees. 2/28/2022  3. Pt will improve her FOTO to 70, demonstrating improved functional ADL capacity.             VF: [id number], 63%. 4. Pt will demonstrate proper mechanics/form with jogging and light jumps to improve pt's ability to return to recreational activities.             XZ: Introduced shuttle press plyometric jumps.     PLAN  []  Upgrade activities as tolerated     [x]  Continue plan of care  []  Update interventions per flow sheet       []  Discharge due to:_  []  Other:_      Sarita Blue PTA 3/7/2022  11:14 AM    Future Appointments   Date Time Provider Graciela Loyd   3/7/2022 11:15 AM [facility]

## 2022-04-04 ENCOUNTER — TELEPHONE (OUTPATIENT)
Dept: PHYSICAL THERAPY | Age: 22
End: 2022-04-04

## 2022-05-09 NOTE — PROGRESS NOTES
[facility]  [address]  [place], [address]  [phone] fax    Physical Therapy Discharge Summary  Patient name: Huan Vega Start of Care: 2021   Referral source: Sumeet Carpio : 2000   Medical/Treatment Diagnosis: Left knee pain [M25.562]  Status post knee surgery [Z98.890]  Payor: [employer]  / Plan: [address] / Product Type: Managed Care Medicaid /  Onset Date:2021 DOS     Prior Hospitalization: see medical history Provider#: [id number]   Medications: Verified on Patient Summary List    Comorbidities: meniscus tear 1.5 years ago, allergies   Prior Level of Function: pt was running regularly, performing barbell squats, regular gym attendance without knee pain  Visits from Start of Care: 23    Missed Visits: 2  Reporting Period : 2022 to 3/7/2022      Summary of Care:  1. Pt will improve her left knee strength to 4+/5 MMT to improve stability for stair negotiation and squatting.              PN: progressing - left HS 4-/5, left quad 4+/5              Current:  MMT Left knee quad 4+/5, HS 4+/5. 3/7/2022  2. Pt will improve her left knee AROM to 0-120 deg to improve gait mechanics.             PN: progressing, 0-2-140 deg.              SRKFAAT: AROM Left knee 1-145 degrees. 2022  3. Pt will improve her FOTO to 70, demonstrating improved functional ADL capacity.             ZO: [id number], 63%. 4. Pt will demonstrate proper mechanics/form with jogging and light jumps to improve pt's ability to return to recreational activities.             OU: Introduced shuttle press plyometric jumps. ASSESSMENT/RECOMMENDATIONS:  Pt has not returned to therapy since 3/7/2022 and is being discharged at this time. Unable to reassess goals at this time. D/C without further pt instructions.  Thank you for this referral    [x]Discontinue therapy: [x]Patient has reached or is progressing toward set goals      [x]Patient is non-compliant or has abdicated      []Due to lack of appreciable progress towards set goals    Wilder Wall, PT 5/9/2022 9:08 AM    NOTE TO PHYSICIAN:  Please complete the following and fax to: [facility] at \[facility]\"" at [phone]  . Retain this original for your records. If you are unable to process this request in   24 hours, please contact our office.      [] I have read the above report and request that my patient continue therapy with the following changes/special instructions:  [] I have read the above report and request that my patient be discharged from therapy    Physician's Signature:____________Date:_________TIME:________     STACEY Francis  ** Signature, Date and Time must be completed for valid certification **

## 2022-10-24 NOTE — TELEPHONE ENCOUNTER
----- Message from Kelli Santos MD sent at 10/22/2022  8:15 AM CDT -----  Tsh slightly suppressed   But no dose change   She is getting a good dose   Work on smoking and weight  Repeat tsh and free t4 in 4month   Patient came in requesting a work note saying light duty once returning to work . If any questions about the note please call her.

## (undated) DEVICE — DRILL SURG FLIPCUTTER III

## (undated) DEVICE — SUTURE VCRL SZ 3-0 L27IN ABSRB UD L36MM CT-1 1/2 CIR J258H

## (undated) DEVICE — INTENDED FOR TISSUE SEPARATION, AND OTHER PROCEDURES THAT REQUIRE A SHARP SURGICAL BLADE TO PUNCTURE OR CUT.: Brand: BARD-PARKER SAFETY BLADES SIZE 15, STERILE

## (undated) DEVICE — BRACE KNEE 17 27IN R L UNIV FIT UPTO 305IN THGH T SCP

## (undated) DEVICE — NEEDLE NRV BLK 21GA L4IN 30DEG INSUL BVL EXTN SET STIMUPLEX

## (undated) DEVICE — SUTURE FIBERWIRE FIBERSTICK SZ 2-0 L50/12IN NONABSORBABLE AR7209

## (undated) DEVICE — SOFT SILICONE HYDROCELLULAR SACRUM DRESSING WITH LOCK AWAY LAYER: Brand: ALLEVYN LIFE SACRUM (LARGE) PACK OF 10

## (undated) DEVICE — PREP SKN CHLRAPRP APL 26ML STR --

## (undated) DEVICE — 4-PORT MANIFOLD: Brand: NEPTUNE 2

## (undated) DEVICE — INTENDED FOR TISSUE SEPARATION, AND OTHER PROCEDURES THAT REQUIRE A SHARP SURGICAL BLADE TO PUNCTURE OR CUT.: Brand: BARD-PARKER SAFETY BLADES SIZE 11, STERILE

## (undated) DEVICE — TAPE UMB 1/8X30IN MP COT WHT --

## (undated) DEVICE — DRAPE,REIN 53X77,STERILE: Brand: MEDLINE

## (undated) DEVICE — SUTURE MCRYL SZ 4-0 L27IN ABSRB UD L24MM PS-1 3/8 CIR PRIM Y935H

## (undated) DEVICE — SET FLD MGMT TB ARTHRO IRRIG ASPIR INFLO DISP PMP CASS

## (undated) DEVICE — INFLOW CASSETTE TUBING, DO NOT USE IF PACKAGE IS DAMAGED: Brand: CROSSFLOW

## (undated) DEVICE — SUTURE VCRL SZ 3-0 L27IN ABSRB UD L26MM SH 1/2 CIR J416H

## (undated) DEVICE — STERILE POLYISOPRENE POWDER-FREE SURGICAL GLOVES: Brand: PROTEXIS

## (undated) DEVICE — 90-S, SUCTION PROBE, NON-BENDABLE, MAX CUT LEVEL 11: Brand: SERFAS ENERGY

## (undated) DEVICE — CRUTCH ALUM AD MED LF 300LB --

## (undated) DEVICE — PENCIL ES L3M BTTN SWCH S STL HEX LOK BLDE ELECTRD HOLSTER

## (undated) DEVICE — SUTURE TIGERSTICK TIGERWIRE SZ 2-0 L50IN NONABSORBABLE AR7209T

## (undated) DEVICE — SOLUTION IRRIG 3000ML 0.9% SOD CHL FLX CONT 0797208] ICU MEDICAL INC]

## (undated) DEVICE — DISPOSABLE TOURNIQUET CUFF SINGLE BLADDER, SINGLE PORT AND QUICK CONNECT CONNECTOR: Brand: COLOR CUFF

## (undated) DEVICE — SUTURE SUTTAPE L40IN DIA1.3MM NONABSORBABLE WHT BLU L26.5MM AR7500

## (undated) DEVICE — INTENDED FOR TISSUE SEPARATION, AND OTHER PROCEDURES THAT REQUIRE A SHARP SURGICAL BLADE TO PUNCTURE OR CUT.: Brand: BARD-PARKER ® CARBON RIB-BACK BLADES

## (undated) DEVICE — GARMENT,MEDLINE,DVT,INT,CALF,MED, GEN2: Brand: MEDLINE

## (undated) DEVICE — DRAIN PENR 0.25X18IN LTX STRL -- PENROSE LATEX

## (undated) DEVICE — [AGGRESSIVE 6-FLUTE BARREL BUR, ARTHROSCOPIC SHAVER BLADE,  DO NOT RESTERILIZE,  DO NOT USE IF PACKAGE IS DAMAGED,  KEEP DRY,  KEEP AWAY FROM SUNLIGHT]: Brand: FORMULA

## (undated) DEVICE — BLANKET WRM AD W50XL85.8IN PACU FULL BODY FORC AIR

## (undated) DEVICE — SUTURE FIBERLOOP SZ 2-0 L20IN NONABSORBABLE BLU STR NDL AR7234

## (undated) DEVICE — NEEDLE HYPO 22GA L1.5IN BLK S STL HUB POLYPR SHLD REG BVL

## (undated) DEVICE — REM POLYHESIVE ADULT PATIENT RETURN ELECTRODE: Brand: VALLEYLAB

## (undated) DEVICE — DRAPE,ARTHRO,W/POUCH,STERILE: Brand: MEDLINE